# Patient Record
Sex: MALE | HISPANIC OR LATINO | Employment: OTHER | URBAN - METROPOLITAN AREA
[De-identification: names, ages, dates, MRNs, and addresses within clinical notes are randomized per-mention and may not be internally consistent; named-entity substitution may affect disease eponyms.]

---

## 2017-07-07 ENCOUNTER — TELEPHONE (OUTPATIENT)
Dept: CARDIOLOGY | Facility: MEDICAL CENTER | Age: 67
End: 2017-07-07

## 2017-07-07 ENCOUNTER — APPOINTMENT (OUTPATIENT)
Dept: RADIOLOGY | Facility: MEDICAL CENTER | Age: 67
End: 2017-07-07
Attending: EMERGENCY MEDICINE
Payer: MEDICARE

## 2017-07-07 ENCOUNTER — HOSPITAL ENCOUNTER (EMERGENCY)
Facility: MEDICAL CENTER | Age: 67
End: 2017-07-07
Attending: EMERGENCY MEDICINE
Payer: MEDICARE

## 2017-07-07 VITALS
WEIGHT: 170 LBS | TEMPERATURE: 97 F | OXYGEN SATURATION: 97 % | HEART RATE: 58 BPM | DIASTOLIC BLOOD PRESSURE: 78 MMHG | SYSTOLIC BLOOD PRESSURE: 133 MMHG | HEIGHT: 67 IN | BODY MASS INDEX: 26.68 KG/M2 | RESPIRATION RATE: 14 BRPM

## 2017-07-07 DIAGNOSIS — I48.0 PAROXYSMAL ATRIAL FIBRILLATION (HCC): ICD-10-CM

## 2017-07-07 LAB
ANION GAP SERPL CALC-SCNC: 9 MMOL/L (ref 0–11.9)
APTT PPP: 35.5 SEC (ref 24.7–36)
BASOPHILS # BLD AUTO: 0.3 % (ref 0–1.8)
BASOPHILS # BLD: 0.02 K/UL (ref 0–0.12)
BUN SERPL-MCNC: 14 MG/DL (ref 8–22)
CALCIUM SERPL-MCNC: 8.4 MG/DL (ref 8.5–10.5)
CHLORIDE SERPL-SCNC: 112 MMOL/L (ref 96–112)
CO2 SERPL-SCNC: 20 MMOL/L (ref 20–33)
CREAT SERPL-MCNC: 0.94 MG/DL (ref 0.5–1.4)
EOSINOPHIL # BLD AUTO: 0.13 K/UL (ref 0–0.51)
EOSINOPHIL NFR BLD: 2.1 % (ref 0–6.9)
ERYTHROCYTE [DISTWIDTH] IN BLOOD BY AUTOMATED COUNT: 44.4 FL (ref 35.9–50)
GFR SERPL CREATININE-BSD FRML MDRD: >60 ML/MIN/1.73 M 2
GLUCOSE SERPL-MCNC: 120 MG/DL (ref 65–99)
HCT VFR BLD AUTO: 41.5 % (ref 42–52)
HGB BLD-MCNC: 14.2 G/DL (ref 14–18)
IMM GRANULOCYTES # BLD AUTO: 0.02 K/UL (ref 0–0.11)
IMM GRANULOCYTES NFR BLD AUTO: 0.3 % (ref 0–0.9)
INR PPP: 1.2 (ref 0.87–1.13)
LV EJECT FRACT  99904: 60
LV EJECT FRACT MOD 2C 99903: 64.63
LV EJECT FRACT MOD 4C 99902: 52.71
LV EJECT FRACT MOD BP 99901: 58.45
LYMPHOCYTES # BLD AUTO: 2.28 K/UL (ref 1–4.8)
LYMPHOCYTES NFR BLD: 37.3 % (ref 22–41)
MCH RBC QN AUTO: 30.9 PG (ref 27–33)
MCHC RBC AUTO-ENTMCNC: 34.2 G/DL (ref 33.7–35.3)
MCV RBC AUTO: 90.4 FL (ref 81.4–97.8)
MONOCYTES # BLD AUTO: 0.57 K/UL (ref 0–0.85)
MONOCYTES NFR BLD AUTO: 9.3 % (ref 0–13.4)
NEUTROPHILS # BLD AUTO: 3.09 K/UL (ref 1.82–7.42)
NEUTROPHILS NFR BLD: 50.7 % (ref 44–72)
NRBC # BLD AUTO: 0.02 K/UL
NRBC BLD AUTO-RTO: 0.3 /100 WBC
PLATELET # BLD AUTO: 202 K/UL (ref 164–446)
PMV BLD AUTO: 9.3 FL (ref 9–12.9)
POTASSIUM SERPL-SCNC: 3.8 MMOL/L (ref 3.6–5.5)
PROTHROMBIN TIME: 15.6 SEC (ref 12–14.6)
RBC # BLD AUTO: 4.59 M/UL (ref 4.7–6.1)
SODIUM SERPL-SCNC: 141 MMOL/L (ref 135–145)
TROPONIN I SERPL-MCNC: <0.01 NG/ML (ref 0–0.04)
WBC # BLD AUTO: 6.1 K/UL (ref 4.8–10.8)

## 2017-07-07 PROCEDURE — 71010 DX-CHEST-PORTABLE (1 VIEW): CPT

## 2017-07-07 PROCEDURE — 94760 N-INVAS EAR/PLS OXIMETRY 1: CPT

## 2017-07-07 PROCEDURE — 85610 PROTHROMBIN TIME: CPT

## 2017-07-07 PROCEDURE — 36415 COLL VENOUS BLD VENIPUNCTURE: CPT

## 2017-07-07 PROCEDURE — 93005 ELECTROCARDIOGRAM TRACING: CPT | Performed by: EMERGENCY MEDICINE

## 2017-07-07 PROCEDURE — 93306 TTE W/DOPPLER COMPLETE: CPT | Mod: 26 | Performed by: INTERNAL MEDICINE

## 2017-07-07 PROCEDURE — 99285 EMERGENCY DEPT VISIT HI MDM: CPT

## 2017-07-07 PROCEDURE — 85025 COMPLETE CBC W/AUTO DIFF WBC: CPT

## 2017-07-07 PROCEDURE — 80048 BASIC METABOLIC PNL TOTAL CA: CPT

## 2017-07-07 PROCEDURE — 85730 THROMBOPLASTIN TIME PARTIAL: CPT

## 2017-07-07 PROCEDURE — 93306 TTE W/DOPPLER COMPLETE: CPT

## 2017-07-07 PROCEDURE — 84484 ASSAY OF TROPONIN QUANT: CPT

## 2017-07-07 ASSESSMENT — PAIN SCALES - GENERAL: PAINLEVEL_OUTOF10: 0

## 2017-07-07 ASSESSMENT — LIFESTYLE VARIABLES: DO YOU DRINK ALCOHOL: NO

## 2017-07-07 NOTE — ED PROVIDER NOTES
ED Provider Note    Scribed for Uvaldo Ramirez M.D. by Michell Garza. 7/7/2017  9:46 AM    Primary care provider: Lacie Shafer M.D.  Means of arrival: ambulance   History obtained from: patient   History limited by: none     CHIEF COMPLAINT  Chief Complaint   Patient presents with   • Dizziness   • Atrial Fibrillation       JEAN-PIERRE Romero is a 66 y.o. male who presents to the Emergency Department for evaluation of new onset atrial fibrillation onset this morning. The patient was being evaluated at the [facility] clinic earlier this morning for lightheadedness and generalized weakness onset three days ago. Patient states his symptoms have been worse while walking. An abnormal EKG indicated narrow complex tachycardia at the [facility] clinic. EMS were called for the patient. He received 6 doses of adenosine prior to his evaluation today secondary to concern for SVT.  The patient was shown to have atrial fibrillation after receiving the adenosine. He spontaneously self converted prior to evaluation.  He denies chest pain, shortness of breath and palpitations. He took aspirin prior to arrival.       REVIEW OF SYSTEMS  Pertinent positives include generalized weakness, lightheadedness.   Pertinent negatives include no chest pain, shortness of breath, palpitations.    All other systems reviewed and negative.  C.       PAST MEDICAL HISTORY   has a past medical history of Atrial fibrillation (8/28/2012); Chest pain (8/28/2012); Palpitations (8/28/2012); Hyperlipemia (8/28/2012); and Back pain.      SURGICAL HISTORY  patient denies any surgical history      SOCIAL HISTORY  Social History   Substance Use Topics   • Smoking status: Former Smoker -- 0.50 packs/day for 10 years     Quit date: 08/28/1982   • Smokeless tobacco: Never Used   • Alcohol Use: No      History   Drug Use Not on file       FAMILY HISTORY  None noted       CURRENT MEDICATIONS  Home Medications     **Home medications have not yet been reviewed for this  "encounter**          ALLERGIES  Allergies   Allergen Reactions   • Nkda [No Known Drug Allergy]        PHYSICAL EXAM  VITAL SIGNS: /78 mmHg  Pulse 148  Temp(Src) 36.1 °C (97 °F)  Resp 16  Ht 1.702 m (5' 7.01\")  Wt 77.111 kg (170 lb)  BMI 26.62 kg/m2  Nursing note and vitals reviewed.  Constitutional: Well-developed and well-nourished. No distress.   HENT: Head is normocephalic and atraumatic. Oropharynx is clear and moist without exudate or erythema.   Eyes: Pupils are equal, round, and reactive to light. Conjunctiva are normal.   Cardiovascular: Spontaneously converted to NSR upon my entry to the room.  Normal rate and regular rhythm. No murmur heard. Normal radial pulses.  Pulmonary/Chest: Breath sounds normal. No wheezes or rales.   Abdominal: Soft and non-tender. No distention    Musculoskeletal: Extremities exhibit normal range of motion without edema or tenderness.   Neurological: Awake, alert and oriented to person, place, and time. No focal deficits noted.  Skin: Skin is warm and dry. No rash.   Psychiatric: Normal mood and affect. Appropriate for clinical situation        DIAGNOSTIC STUDIES / PROCEDURES  LABS  Results for orders placed or performed during the hospital encounter of 07/07/17   CBC w/ Differential   Result Value Ref Range    WBC 6.1 4.8 - 10.8 K/uL    RBC 4.59 (L) 4.70 - 6.10 M/uL    Hemoglobin 14.2 14.0 - 18.0 g/dL    Hematocrit 41.5 (L) 42.0 - 52.0 %    MCV 90.4 81.4 - 97.8 fL    MCH 30.9 27.0 - 33.0 pg    MCHC 34.2 33.7 - 35.3 g/dL    RDW 44.4 35.9 - 50.0 fL    Platelet Count 202 164 - 446 K/uL    MPV 9.3 9.0 - 12.9 fL    Neutrophils-Polys 50.70 44.00 - 72.00 %    Lymphocytes 37.30 22.00 - 41.00 %    Monocytes 9.30 0.00 - 13.40 %    Eosinophils 2.10 0.00 - 6.90 %    Basophils 0.30 0.00 - 1.80 %    Immature Granulocytes 0.30 0.00 - 0.90 %    Nucleated RBC 0.30 /100 WBC    Neutrophils (Absolute) 3.09 1.82 - 7.42 K/uL    Lymphs (Absolute) 2.28 1.00 - 4.80 K/uL    Monos (Absolute) " 0.57 0.00 - 0.85 K/uL    Eos (Absolute) 0.13 0.00 - 0.51 K/uL    Baso (Absolute) 0.02 0.00 - 0.12 K/uL    Immature Granulocytes (abs) 0.02 0.00 - 0.11 K/uL    NRBC (Absolute) 0.02 K/uL   EKG (ER)   Result Value Ref Range    Report       [facility] Emergency Dept.    Test Date:  2017  Pt Name:    MICHAEL VILLAR              Department: ER  MRN:        [id number]                      Room:        02  Gender:     M                            Technician: [id number]  :        1950                   Requested By:JAIME SEXTON  Order #:    [id number]                    Reading MD:    Measurements  Intervals                                Axis  Rate:       66                           P:          60  AZ:         160                          QRS:        -13  QRSD:       92                           T:          47  QT:         412  QTc:        432    Interpretive Statements  SINUS RHYTHM  No previous ECG available for comparison     All labs reviewed by me.      RADIOLOGY  DX-CHEST-PORTABLE (1 VIEW)    (Results Pending)   The radiologist's interpretation of all radiological studies have been reviewed by me.      COURSE & MEDICAL DECISION MAKING  Nursing notes, VS, PMSFHx reviewed in chart.     Review of past medical records shows the patient's last note is from 2012 from the [facility] clinic.       9:46 AM - Patient seen and examined at bedside. Ordered DX chest, CBC, BMP, troponin, APTT, INR, and EKG to evaluate his symptoms.     9:49 AM The patient self converted in the ED.     9:51 AM Obtained and reviewed patient's records from earlier today which indicated the patient had narrow complex tachycardia with a rate of 150. The patient was given adenosine by paramedics which revealed atrial fibrillation.     9:53 AM Paged cardiology.     10:01 AM Consult with cardiology who agree to see the patient. Further treatment and disposition as per cardiology recommendations.      FINAL  IMPRESSION  1. Paroxysmal atrial fibrillation (CMS-HCC)         Michell ULLOA (Xochiltibneo), am scribing for, and in the presence of, Uvaldo Ramirez M.D..  Electronically signed by: Michell Garza (Geraldine), 7/7/2017  Uvaldo ULLOA M.D. personally performed the services described in this documentation, as scribed by Michell Garza in my presence, and it is both accurate and complete.    The note accurately reflects work and decisions made by me.  Uvaldo Ramirez  7/7/2017  11:58 AM

## 2017-07-07 NOTE — CONSULTS
Cardiology consultation note    Requesting physician: Dr. Ramirez ED physician    Reason for consultation Atrial fibrillation with rapid rate    HPI    The patient is a 66 y.o. male with history of paroxysmal atrial fibrillation at least since 2004. It has been somewhat infrequent, every few months or so.  He presents to the Emergency Department for evaluation of palpitations and dizziness since this morning.   He felt his heart was fast. He was also short of breath but no chest pain.  He had another episode a few days ago. He was being evaluated at the [facility] clinic earlier this morning.  EKG there by my review showed very rapid narrow complex tachycardia, slightly irregular. He was noted to be atrial fibrillation after receiving the adenosine. He spontaneously self converted prior to evaluation.    He denies exertional chest pain or recent change in exercise tolerance. He took aspirin prior to arrival.   No prior stroke, MI, CHF, DM or bleeding issue.    NKDA    Home medications; simvastatin, aspirin  Also taking another medication once a day for his heart but does not know the name    Past Medical History   Diagnosis Date   • Atrial fibrillation (CMS-HCC) 8/28/2012   • CHEST PAIN 8/28/2012   • Palpitations 8/28/2012   • Hyperlipemia 8/28/2012   • Back pain      History reviewed. No pertinent past surgical history.    No pertinent family history.    Social History     Social History   • Marital Status:      Spouse Name: N/A   • Number of Children: N/A   • Years of Education: N/A     Occupational History   • Not on file.     Social History Main Topics   • Smoking status: Former Smoker -- 0.50 packs/day for 10 years     Quit date: 08/28/1982   • Smokeless tobacco: Never Used   • Alcohol Use: No   • Drug Use: Not on file   • Sexual Activity: Not on file     Other Topics Concern   • Not on file     Social History Narrative     REVIEW OF SYSTEMS  Pertinent positives include generalized weakness, lightheadedness.  "   Pertinent negatives include no chest pain, shortness of breath, palpitations.     All other systems reviewed and negative.        PHYSICAL EXAM  VITAL SIGNS: /78 mmHg  Pulse 60  Temp(Src) 36.1 °C (97 °F)  Resp 16  Ht 1.702 m (5' 7.01\")  Wt 77.111 kg (170 lb)  BMI 26.62 kg/m2  Nursing note and vitals reviewed.  Constitutional: Well-developed and well-nourished. No distress.   HENT: Head is normocephalic and atraumatic. Oropharynx is clear and moist without exudate or erythema.   Eyes: Pupils are equal, round, and reactive to light. Conjunctiva are normal.    Cardiovascular: Spontaneously converted to NSR upon my entry to the room.  Normal rate and regular rhythm. No murmur heard. Normal radial pulses.  Pulmonary/Chest: Breath sounds normal. No wheezes or rales.   Abdominal: Soft and non-tender. No distention    Musculoskeletal: Extremities exhibit normal range of motion without edema or tenderness.   Neurological: Awake, alert and oriented to person, place, and time. No focal deficits noted.  Skin: Skin is warm and dry. No rash.   Psychiatric: Normal mood and affect. Appropriate for clinical situation    ECHO by my review showed normal LVSF, NL wall motion, mild LAE, trace MR    Impressions    1. Paroxysmal atrial fibrillation. [id number] score is 1  2. Hyperlipidemia    Recommendations:  Will try sotalol 80 mg BID.  Continue aspirin and statin.  Stop the third medication for now.  To follow up in our office next week (Monday or Tuesday).  Need EKG in clinic      "

## 2017-07-07 NOTE — ED AVS SNAPSHOT
7/7/2017    Abel [address] [address]    Dear Abel:    [facility] wants to ensure your discharge home is safe and you or your loved ones have had all of your questions answered regarding your care after you leave the hospital.    Below is a list of resources and contact information should you have any questions regarding your hospital stay, follow-up instructions, or active medical symptoms.    Questions or Concerns Regarding… Contact   Medical Questions Related to Your Discharge  (7 days a week, 8am-5pm) Contact a Nurse Care Coordinator   [phone]   Medical Questions Not Related to Your Discharge  (24 hours a day / 7 days a week)  Contact the Nurse Health Line   [phone]    Medications or Discharge Instructions Refer to your discharge packet   or contact your [facility] Primary Care Provider   [phone]   Follow-up Appointment(s) Schedule your appointment via [employer]   or contact Scheduling [phone]   Billing Review your statement via [employer]  or contact Billing [phone]   Medical Records Review your records via [employer]   or contact Medical Records [phone]     You may receive a telephone call within two days of discharge. This call is to make certain you understand your discharge instructions and have the opportunity to have any questions answered. You can also easily access your medical information, test results and upcoming appointments via the [employer] free online health management tool. You can learn more and sign up at [employer]ranem. For assistance setting up your [employer] account, please call [phone].    Once again, we want to ensure your discharge home is safe and that you have a clear understanding of any next steps in your care. If you have any questions or concerns, please do not hesitate to contact us, we are here for you. Thank you for choosing [facility] for your healthcare needs.    Sincerely,    Your [facility] Healthcare Team

## 2017-07-07 NOTE — DISCHARGE INSTRUCTIONS
Atrial Fibrillation  Atrial fibrillation is a type of irregular heart rhythm (arrhythmia). During atrial fibrillation, the upper chambers of the heart (atria) quiver continuously in a chaotic pattern. This causes an irregular and often rapid heart rate.   Atrial fibrillation is the result of the heart becoming overloaded with disorganized signals that tell it to beat. These signals are normally released one at a time by a part of the right atrium called the sinoatrial node. They then travel from the atria to the lower chambers of the heart (ventricles), causing the atria and ventricles to contract and pump blood as they pass. In atrial fibrillation, parts of the atria outside of the sinoatrial node also release these signals. This results in two problems. First, the atria receive so many signals that they do not have time to fully contract. Second, the ventricles, which can only receive one signal at a time, beat irregularly and out of rhythm with the atria.   There are three types of atrial fibrillation:   · Paroxysmal. Paroxysmal atrial fibrillation starts suddenly and stops on its own within a week.  · Persistent. Persistent atrial fibrillation lasts for more than a week. It may stop on its own or with treatment.  · Permanent. Permanent atrial fibrillation does not go away. Episodes of atrial fibrillation may lead to permanent atrial fibrillation.  Atrial fibrillation can prevent your heart from pumping blood normally. It increases your risk of stroke and can lead to heart failure.   CAUSES   · Heart conditions, including a heart attack, heart failure, coronary artery disease, and heart valve conditions.    · Inflammation of the sac that surrounds the heart (pericarditis).  · Blockage of an artery in the lungs (pulmonary embolism).  · Pneumonia or other infections.  · Chronic lung disease.  · Thyroid problems, especially if the thyroid is overactive (hyperthyroidism).  · Caffeine, excessive alcohol use, and use  of some illegal drugs.    · Use of some medicines, including certain decongestants and diet pills.  · Heart surgery.    · Birth defects.    Sometimes, no cause can be found. When this happens, the atrial fibrillation is called lone atrial fibrillation. The risk of complications from atrial fibrillation increases if you have lone atrial fibrillation and you are age 60 years or older.  RISK FACTORS  · Heart failure.  · Coronary artery disease.  · Diabetes mellitus.    · High blood pressure (hypertension).    · Obesity.    · Other arrhythmias.    · Increased age.  SIGNS AND SYMPTOMS   · A feeling that your heart is beating rapidly or irregularly.    · A feeling of discomfort or pain in your chest.    · Shortness of breath.    · Sudden light-headedness or weakness.    · Getting tired easily when exercising.    · Urinating more often than normal (mainly when atrial fibrillation first begins).    In paroxysmal atrial fibrillation, symptoms may start and suddenly stop.  DIAGNOSIS   Your health care provider may be able to detect atrial fibrillation when taking your pulse. Your health care provider may have you take a test called an ambulatory electrocardiogram (ECG). An ECG records your heartbeat patterns over a 24-hour period. You may also have other tests, such as:  · Transthoracic echocardiogram (TTE). During echocardiography, sound waves are used to evaluate how blood flows through your heart.  · Transesophageal echocardiogram (DUSTY).  · Stress test. There is more than one type of stress test. If a stress test is needed, ask your health care provider about which type is best for you.  · Chest X-ray exam.  · Blood tests.  · Computed tomography (CT).  TREATMENT   Treatment may include:  · Treating any underlying conditions. For example, if you have an overactive thyroid, treating the condition may correct atrial fibrillation.  · Taking medicine. Medicines may be given to control a rapid heart rate or to prevent blood  clots, heart failure, or a stroke.  · Having a procedure to correct the rhythm of the heart:  ¨ Electrical cardioversion. During electrical cardioversion, a controlled, low-energy shock is delivered to the heart through your skin. If you have chest pain, very low blood pressure, or sudden heart failure, this procedure may need to be done as an emergency.  ¨ Catheter ablation. During this procedure, heart tissues that send the signals that cause atrial fibrillation are destroyed.  ¨ Surgical ablation. During this surgery, thin lines of heart tissue that carry the abnormal signals are destroyed. This procedure can either be an open-heart surgery or a minimally invasive surgery. With the minimally invasive surgery, small cuts are made to access the heart instead of a large opening.  ¨ Pulmonary venous isolation. During this surgery, tissue around the veins that carry blood from the lungs (pulmonary veins) is destroyed. This tissue is thought to carry the abnormal signals.  HOME CARE INSTRUCTIONS   · Take medicines only as directed by your health care provider. Some medicines can make atrial fibrillation worse or recur.  · If blood thinners were prescribed by your health care provider, take them exactly as directed. Too much blood-thinning medicine can cause bleeding. If you take too little, you will not have the needed protection against stroke and other problems.  · Perform blood tests at home if directed by your health care provider. Perform blood tests exactly as directed.  · Quit smoking if you smoke.  · Do not drink alcohol.  · Do not drink caffeinated beverages such as coffee, soda, and some teas. You may drink decaffeinated coffee, soda, or tea.    · Maintain a healthy weight. Do not use diet pills unless your health care provider approves. They may make heart problems worse.    · Follow diet instructions as directed by your health care provider.  · Exercise regularly as directed by your health care  provider.  · Keep all follow-up visits as directed by your health care provider. This is important.  PREVENTION   The following substances can cause atrial fibrillation to recur:   · Caffeinated beverages.  · Alcohol.  · Certain medicines, especially those used for breathing problems.  · Certain herbs and herbal medicines, such as those containing ephedra or ginseng.  · Illegal drugs, such as cocaine and amphetamines.  Sometimes medicines are given to prevent atrial fibrillation from recurring. Proper treatment of any underlying condition is also important in helping prevent recurrence.   SEEK MEDICAL CARE IF:  · You notice a change in the rate, rhythm, or strength of your heartbeat.  · You suddenly begin urinating more frequently.  · You tire more easily when exerting yourself or exercising.  SEEK IMMEDIATE MEDICAL CARE IF:   · You have chest pain, abdominal pain, sweating, or weakness.  · You feel nauseous.  · You have shortness of breath.  · You suddenly have swollen feet and ankles.  · You feel dizzy.  · Your face or limbs feel numb or weak.  · You have a change in your vision or speech.  MAKE SURE YOU:   · Understand these instructions.  · Will watch your condition.  · Will get help right away if you are not doing well or get worse.     This information is not intended to replace advice given to you by your health care provider. Make sure you discuss any questions you have with your health care provider.     Document Released: 12/18/2006 Document Revised: 01/08/2016 Document Reviewed: 04/13/2016  [employer] Interactive Patient Education ©2016 [employer]

## 2017-07-07 NOTE — ED AVS SNAPSHOT
Home Care Instructions                                                                                                                Abel Romero   MRN: [id number]    Department:  [facility], Emergency Dept   Date of Visit:  7/7/2017            [facility], Emergency Dept    [address] [address]    Phone:  [phone]      You were seen by     Uvaldo Ramirez M.D.      Your Diagnosis Was     Paroxysmal atrial fibrillation ([facility])     I48.0       Follow-up Information     1. Follow up with [facility], Emergency Dept.    Specialty:  Emergency Medicine    Why:  If symptoms worsen    Contact information    [address] [address] [phone]        2. Schedule an appointment as soon as possible for a visit with Skinny Post M.D..    Specialty:  Interventional Cardiology    Contact information    [address]  [address]  [facility] [address] [phone]        Medication Information     Review all of your home medications and newly ordered medications with your primary doctor and/or pharmacist as soon as possible. Follow medication instructions as directed by your doctor and/or pharmacist.     Please keep your complete medication list with you and share with your physician. Update the information when medications are discontinued, doses are changed, or new medications (including over-the-counter products) are added; and carry medication information at all times in the event of emergency situations.               Medication List      ASK your doctor about these medications        Instructions    Morning Afternoon Evening Bedtime    NITROSTAT 0.4 MG Subl   Generic drug:  nitroglycerin        Place 0.4 mg under tongue every 5 minutes as needed.   Dose:  0.4 mg                        simvastatin 20 MG Tabs   Commonly known as:  ZOCOR        Take 1 Tab by mouth every evening.   Dose:  20 mg                                   Procedures and tests performed during your visit     APTT    Basic Metabolic Panel (BMP)    CBC w/ Differential    Cardiac Monitoring    DX-CHEST-PORTABLE (1 VIEW)    ECHOCARDIOGRAM COMP W/O CONT    EKG (ER)    EKG (NOW)    ESTIMATED GFR    IV Saline Lock    Oxygen    PT/INR    Pulse Ox    Troponin STAT    VITAL SIGNS        Discharge Instructions       Atrial Fibrillation  Atrial fibrillation is a type of irregular heart rhythm (arrhythmia). During atrial fibrillation, the upper chambers of the heart (atria) quiver continuously in a chaotic pattern. This causes an irregular and often rapid heart rate.   Atrial fibrillation is the result of the heart becoming overloaded with disorganized signals that tell it to beat. These signals are normally released one at a time by a part of the right atrium called the sinoatrial node. They then travel from the atria to the lower chambers of the heart (ventricles), causing the atria and ventricles to contract and pump blood as they pass. In atrial fibrillation, parts of the atria outside of the sinoatrial node also release these signals. This results in two problems. First, the atria receive so many signals that they do not have time to fully contract. Second, the ventricles, which can only receive one signal at a time, beat irregularly and out of rhythm with the atria.   There are three types of atrial fibrillation:   · Paroxysmal. Paroxysmal atrial fibrillation starts suddenly and stops on its own within a week.  · Persistent. Persistent atrial fibrillation lasts for more than a week. It may stop on its own or with treatment.  · Permanent. Permanent atrial fibrillation does not go away. Episodes of atrial fibrillation may lead to permanent atrial fibrillation.  Atrial fibrillation can prevent your heart from pumping blood normally. It increases your risk of stroke and can lead to heart failure.   CAUSES   · Heart conditions, including a heart attack, heart failure,  coronary artery disease, and heart valve conditions.    · Inflammation of the sac that surrounds the heart (pericarditis).  · Blockage of an artery in the lungs (pulmonary embolism).  · Pneumonia or other infections.  · Chronic lung disease.  · Thyroid problems, especially if the thyroid is overactive (hyperthyroidism).  · Caffeine, excessive alcohol use, and use of some illegal drugs.    · Use of some medicines, including certain decongestants and diet pills.  · Heart surgery.    · Birth defects.    Sometimes, no cause can be found. When this happens, the atrial fibrillation is called lone atrial fibrillation. The risk of complications from atrial fibrillation increases if you have lone atrial fibrillation and you are age 60 years or older.  RISK FACTORS  · Heart failure.  · Coronary artery disease.  · Diabetes mellitus.    · High blood pressure (hypertension).    · Obesity.    · Other arrhythmias.    · Increased age.  SIGNS AND SYMPTOMS   · A feeling that your heart is beating rapidly or irregularly.    · A feeling of discomfort or pain in your chest.    · Shortness of breath.    · Sudden light-headedness or weakness.    · Getting tired easily when exercising.    · Urinating more often than normal (mainly when atrial fibrillation first begins).    In paroxysmal atrial fibrillation, symptoms may start and suddenly stop.  DIAGNOSIS   Your health care provider may be able to detect atrial fibrillation when taking your pulse. Your health care provider may have you take a test called an ambulatory electrocardiogram (ECG). An ECG records your heartbeat patterns over a 24-hour period. You may also have other tests, such as:  · Transthoracic echocardiogram (TTE). During echocardiography, sound waves are used to evaluate how blood flows through your heart.  · Transesophageal echocardiogram (DUSTY).  · Stress test. There is more than one type of stress test. If a stress test is needed, ask your health care provider about  which type is best for you.  · Chest X-ray exam.  · Blood tests.  · Computed tomography (CT).  TREATMENT   Treatment may include:  · Treating any underlying conditions. For example, if you have an overactive thyroid, treating the condition may correct atrial fibrillation.  · Taking medicine. Medicines may be given to control a rapid heart rate or to prevent blood clots, heart failure, or a stroke.  · Having a procedure to correct the rhythm of the heart:  ¨ Electrical cardioversion. During electrical cardioversion, a controlled, low-energy shock is delivered to the heart through your skin. If you have chest pain, very low blood pressure, or sudden heart failure, this procedure may need to be done as an emergency.  ¨ Catheter ablation. During this procedure, heart tissues that send the signals that cause atrial fibrillation are destroyed.  ¨ Surgical ablation. During this surgery, thin lines of heart tissue that carry the abnormal signals are destroyed. This procedure can either be an open-heart surgery or a minimally invasive surgery. With the minimally invasive surgery, small cuts are made to access the heart instead of a large opening.  ¨ Pulmonary venous isolation. During this surgery, tissue around the veins that carry blood from the lungs (pulmonary veins) is destroyed. This tissue is thought to carry the abnormal signals.  HOME CARE INSTRUCTIONS   · Take medicines only as directed by your health care provider. Some medicines can make atrial fibrillation worse or recur.  · If blood thinners were prescribed by your health care provider, take them exactly as directed. Too much blood-thinning medicine can cause bleeding. If you take too little, you will not have the needed protection against stroke and other problems.  · Perform blood tests at home if directed by your health care provider. Perform blood tests exactly as directed.  · Quit smoking if you smoke.  · Do not drink alcohol.  · Do not drink caffeinated  beverages such as coffee, soda, and some teas. You may drink decaffeinated coffee, soda, or tea.    · Maintain a healthy weight. Do not use diet pills unless your health care provider approves. They may make heart problems worse.    · Follow diet instructions as directed by your health care provider.  · Exercise regularly as directed by your health care provider.  · Keep all follow-up visits as directed by your health care provider. This is important.  PREVENTION   The following substances can cause atrial fibrillation to recur:   · Caffeinated beverages.  · Alcohol.  · Certain medicines, especially those used for breathing problems.  · Certain herbs and herbal medicines, such as those containing ephedra or ginseng.  · Illegal drugs, such as cocaine and amphetamines.  Sometimes medicines are given to prevent atrial fibrillation from recurring. Proper treatment of any underlying condition is also important in helping prevent recurrence.   SEEK MEDICAL CARE IF:  · You notice a change in the rate, rhythm, or strength of your heartbeat.  · You suddenly begin urinating more frequently.  · You tire more easily when exerting yourself or exercising.  SEEK IMMEDIATE MEDICAL CARE IF:   · You have chest pain, abdominal pain, sweating, or weakness.  · You feel nauseous.  · You have shortness of breath.  · You suddenly have swollen feet and ankles.  · You feel dizzy.  · Your face or limbs feel numb or weak.  · You have a change in your vision or speech.  MAKE SURE YOU:   · Understand these instructions.  · Will watch your condition.  · Will get help right away if you are not doing well or get worse.     This information is not intended to replace advice given to you by your health care provider. Make sure you discuss any questions you have with your health care provider.     Document Released: 12/18/2006 Document Revised: 01/08/2016 Document Reviewed: 04/13/2016  [employer] Interactive Patient Education ©2016 [employer]             Patient Information     Patient Information    Following emergency treatment: all patient requiring follow-up care must return either to a private physician or a clinic if your condition worsens before you are able to obtain further medical attention, please return to the emergency room.     Billing Information    At [facility], we work to make the billing process streamlined for our patients.  Our Representatives are here to answer any questions you may have regarding your hospital bill.  If you have insurance coverage and have supplied your insurance information to us, we will submit a claim to your insurer on your behalf.  Should you have any questions regarding your bill, we can be reached online or by phone as follows:  Online: You are able pay your bills online or live chat with our representatives about any billing questions you may have. We are here to help Monday - Friday from 8:00am to 7:30pm and 9:00am - 12:00pm on Saturdays.  Please visit [email or web address]  for more information.   Phone:  [phone] or [phone]    Please note that your emergency physician, surgeon, pathologist, radiologist, anesthesiologist, and other specialists are not employed by [facility] and will therefore bill separately for their services.  Please contact them directly for any questions concerning their bills at the numbers below:     Emergency Physician Services:  [phone]  [facility]:  [phone]  [facility]:  [phone]  [facility]:  [phone]    1. Your final bill may vary from the amount quoted upon discharge if all procedures are not complete at that time, or if your doctor has additional procedures of which we are not aware. You will receive an additional bill if you return to the Emergency Department at [facility] for suture removal regardless of the facility of which the sutures were placed.     2. Please  arrange for settlement of this account at the emergency registration.    3. All self-pay accounts are due in full at the time of treatment.  If you are unable to meet this obligation then payment is expected within 4-5 days.     4. If you have had radiology studies (CT, X-ray, Ultrasound, MRI), you have received a preliminary result during your emergency department visit. Please contact the radiology department [phone] to receive a copy of your final result. Please discuss the Final result with your primary physician or with the follow up physician provided.     Crisis Hotline:  [place] Crisis Hotline:  0-139-DUVLUFP or 1-572.193.8582  [place] Crisis Hotline:    1-488.489.6326 or 008-418-2432         ED Discharge Follow Up Questions    1. In order to provide you with very good care, we would like to follow up with a phone call in the next few days.  May we have your permission to contact you?     YES /  NO    2. What is the best phone number to call you? (       )_____-__________    3. What is the best time to call you?      Morning  /  Afternoon  /  Evening                   Patient Signature:  ____________________________________________________________    Date:  ____________________________________________________________      Your appointments     Jul 11, 2017  8:15 AM   HOSPITAL FOLLOW UP with Nolan Granados M.D.   [facility] (--)    [address]  [place] [id number] [phone]

## 2017-07-07 NOTE — TELEPHONE ENCOUNTER
Received a call from Dr. Post. He saw the patient in the ER today for atrial fibrillation, he would like the patient seen on Monday or Tuesday next week for follow up.    Patient scheduled for an office visit with Dr. Granados 7/11/2017 at 8:15am. Spoke with patient's wife Kiera over the phone, advised her of appointment date/time.    CHUCK ROTH

## 2017-07-07 NOTE — ED NOTES
"PT BIB [facility] from the [facility] clinic.  PT was being seen for dizziness.  An EKG was completed and pt was found to be in a-fib.  PT had a HR of 210 and a BP of 85/70 PT was given 6 of adenosine and converted to A-fib with a rate of 150, /70.  PT arrives awake and alert in a-fib.  While assessing PT pt converted to NSR rate 64.    Chief Complaint   Patient presents with   • Dizziness   • Atrial Fibrillation     Blood pressure 133/78, pulse 148, temperature 36.1 °C (97 °F), resp. rate 16, height 1.702 m (5' 7.01\"), weight 77.111 kg (170 lb).    "

## 2017-07-07 NOTE — ED AVS SNAPSHOT
Enish Access Code: [email or web address]  Expires: 8/6/2017 12:26 PM    Your email address is not on file at [facility].  Email Addresses are required for you to sign up for Enish, please contact [phone] to verify your personal information and to provide your email address prior to attempting to register for [place]ish.    [place] [address]    Enish  A secure, online tool to manage your health information     [facility]’s Enish® is a secure, online tool that connects you to your personalized health information from the privacy of your home -- day or night - making it very easy for you to manage your healthcare. Once the activation process is completed, you can even access your medical information using the Enish meghana, which is available for free in the Apple Meghana store or Google Play store.     To learn more about Enish, visit [email or web address]    There are two levels of access available (as shown below):   My Chart Features  [facility] Primary Care Doctor [facility]  Specialists [facility]  Urgent  Care Non-[facility] Primary Care Doctor   Email your healthcare team securely and privately 24/7 X X X    Manage appointments: schedule your next appointment; view details of past/upcoming appointments X      Request prescription refills. X      View recent personal medical records, including lab and immunizations X X X X   View health record, including health history, allergies, medications X X X X   Read reports about your outpatient visits, procedures, consult and ER notes X X X X   See your discharge summary, which is a recap of your hospital and/or ER visit that includes your diagnosis, lab results, and care plan X X  X     How to register for Enish:  Once your e-mail address has been verified, follow the following steps to sign up for Enish.     1. Go to  [email or web address]  2. Click on the Sign Up Now box, which takes you to the New Member Sign Up page. You will need  to provide the following information:  a. Enter your Telebit Access Code exactly as it appears at the top of this page. (You will not need to use this code after you’ve completed the sign-up process. If you do not sign up before the expiration date, you must request a new code.)   b. Enter your date of birth.   c. Enter your home email address.   d. Click Submit, and follow the next screen’s instructions.  3. Create a Telebit ID. This will be your Telebit login ID and cannot be changed, so think of one that is secure and easy to remember.  4. Create a Telebit password. You can change your password at any time.  5. Enter your Password Reset Question and Answer. This can be used at a later time if you forget your password.   6. Enter your e-mail address. This allows you to receive e-mail notifications when new information is available in Telebit.  7. Click Sign Up. You can now view your health information.    For assistance activating your Telebit account, call [phone]

## 2017-07-11 ENCOUNTER — OFFICE VISIT (OUTPATIENT)
Dept: CARDIOLOGY | Facility: MEDICAL CENTER | Age: 67
End: 2017-07-11
Payer: MEDICARE

## 2017-07-11 VITALS
WEIGHT: 161 LBS | DIASTOLIC BLOOD PRESSURE: 80 MMHG | HEIGHT: 65 IN | OXYGEN SATURATION: 97 % | SYSTOLIC BLOOD PRESSURE: 126 MMHG | BODY MASS INDEX: 26.82 KG/M2 | HEART RATE: 49 BPM

## 2017-07-11 DIAGNOSIS — E78.5 DYSLIPIDEMIA: ICD-10-CM

## 2017-07-11 DIAGNOSIS — I48.0 PAROXYSMAL ATRIAL FIBRILLATION (HCC): ICD-10-CM

## 2017-07-11 PROCEDURE — 99214 OFFICE O/P EST MOD 30 MIN: CPT | Performed by: INTERNAL MEDICINE

## 2017-07-11 RX ORDER — DOXAZOSIN MESYLATE 1 MG/1
TABLET ORAL
COMMUNITY
Start: 2017-06-29 | End: 2017-09-06

## 2017-07-11 RX ORDER — ATORVASTATIN CALCIUM 20 MG/1
20 TABLET, FILM COATED ORAL DAILY
Status: ON HOLD | COMMUNITY
Start: 2017-06-29 | End: 2017-08-11

## 2017-07-11 RX ORDER — SOTALOL HYDROCHLORIDE 80 MG/1
40 TABLET ORAL 2 TIMES DAILY
Status: ON HOLD | COMMUNITY
Start: 2017-07-07 | End: 2017-08-11

## 2017-07-11 RX ORDER — METOPROLOL SUCCINATE 50 MG/1
TABLET, EXTENDED RELEASE ORAL
COMMUNITY
Start: 2017-06-29 | End: 2017-09-06

## 2017-07-11 NOTE — MR AVS SNAPSHOT
"        Abel Romero   2017 8:15 AM   Office Visit   MRN: [id number]    Department:  [facility]   Dept Phone:  [phone]    Description:  Male : 1950   Provider:  Nolan Granados M.D.           Reason for Visit     Hospital Follow-up           Allergies as of 2017     Allergen Noted Reactions    Nkda [No Known Drug Allergy] 10/01/2012         You were diagnosed with     Paroxysmal atrial fibrillation (CMS-HCC)   [143799]       Dyslipidemia   [453387]         Vital Signs     Blood Pressure Pulse Height Weight Body Mass Index Oxygen Saturation    126/80 mmHg 49 1.651 m (5' 5\") 73.029 kg (161 lb) 26.79 kg/m2 97%    Smoking Status                   Former Smoker           Basic Information     Date Of Birth Sex Race Ethnicity Preferred Language    1950 Male  or   Origin (Vincentian,Belizean,Liberian,Rickie, etc) English      Problem List              ICD-10-CM Priority Class Noted - Resolved    Paroxysmal atrial fibrillation (CMS-HCC) I48.0 Medium  2012 - Present    CHEST PAIN    2012 - Present    Palpitations R00.2   2012 - Present    Dyslipidemia E78.5 Medium  2012 - Present      Health Maintenance        Date Due Completion Dates    IMM DTaP/Tdap/Td Vaccine (1 - Tdap) 1969 ---    COLONOSCOPY 2000 ---    IMM ZOSTER VACCINE 2010 ---    IMM PNEUMOCOCCAL 65+ (ADULT) LOW/MEDIUM RISK SERIES (1 of 2 - PCV13) 2015 ---    IMM INFLUENZA (1) 2017 ---            Results       Current Immunizations     No immunizations on file.      Below and/or attached are the medications your provider expects you to take. Review all of your home medications and newly ordered medications with your provider and/or pharmacist. Follow medication instructions as directed by your provider and/or pharmacist. Please keep your medication list with you and share with your provider. Update the information when medications are discontinued, doses are changed, or " new medications (including over-the-counter products) are added; and carry medication information at all times in the event of emergency situations     Allergies:  NKDA - (reactions not documented)               Medications  Valid as of: July 11, 2017 -  8:36 AM    Generic Name Brand Name Tablet Size Instructions for use    Aspirin (Tab) aspirin 81 MG Take 81 mg by mouth every day.        Atorvastatin Calcium (Tab) LIPITOR 20 MG         Doxazosin Mesylate (Tab) CARDURA 1 MG         Metoprolol Succinate (TABLET SR 24 HR) TOPROL XL 50 MG         Nitroglycerin (SL Tab) NITROSTAT 0.4 MG Place 0.4 mg under tongue every 5 minutes as needed.        Simvastatin (Tab) ZOCOR 20 MG Take 1 Tab by mouth every evening.        Sotalol HCl (Tab) BETAPACE 80 MG         .                 Medicines prescribed today were sent to:     [facility] [address] [place] [address], [place] - [address] [facility] [address]    Phone: [phone] Fax: [phone]    Open 24 Hours?: No      Medication refill instructions:       If your prescription bottle indicates you have medication refills left, it is not necessary to call your provider’s office. Please contact your pharmacy and they will refill your medication.    If your prescription bottle indicates you do not have any refills left, you may request refills at any time through one of the following ways: The online Playrific system (except Urgent Care), by calling your provider’s office, or by asking your pharmacy to contact your provider’s office with a refill request. Medication refills are processed only during regular business hours and may not be available until the next business day. Your provider may request additional information or to have a follow-up visit with you prior to refilling your medication.   *Please Note: Medication refills are assigned a new Rx number when refilled electronically. Your pharmacy may indicate that no refills were authorized even though a new prescription for  the same medication is available at the pharmacy. Please request the medicine by name with the pharmacy before contacting your provider for a refill.        Referral     A referral request has been sent to our patient care coordination department. Please allow 3-5 business days for us to process this request and contact you either by phone or mail. If you do not hear from us by the 5th business day, please call us at [phone].           SnapYeti Access Code: [email or web address]  Expires: 8/6/2017 12:26 PM    SnapYeti  A secure, online tool to manage your health information     [employer]’s SnapYeti® is a secure, online tool that connects you to your personalized health information from the privacy of your home -- day or night - making it very easy for you to manage your healthcare. Once the activation process is completed, you can even access your medical information using the SnapYeti meghana, which is available for free in the Apple Meghana store or Google Play store.     SnapYeti provides the following levels of access (as shown below):   My Chart Features   [facility] Doctor [facility]  Specialists [facility]  Urgent  Care [facility]  Primary Care  Doctor   Email your healthcare team securely and privately 24/7 X X X    Manage appointments: schedule your next appointment; view details of past/upcoming appointments X      Request prescription refills. X      View recent personal medical records, including lab and immunizations X X X X   View health record, including health history, allergies, medications X X X X   Read reports about your outpatient visits, procedures, consult and ER notes X X X X   See your discharge summary, which is a recap of your hospital and/or ER visit that includes your diagnosis, lab results, and care plan. X X       How to register for SnapYeti:  1. Go to  [email or web address].  2. Click on the Sign Up Now box, which takes you to the New Member Sign Up page. You will need to provide the  following information:  a. Enter your YDreams - Inform[employer]¡tica Access Code exactly as it appears at the top of this page. (You will not need to use this code after you’ve completed the sign-up process. If you do not sign up before the expiration date, you must request a new code.)   b. Enter your date of birth.   c. Enter your home email address.   d. Click Submit, and follow the next screen’s instructions.  3. Create a YDreams - Inform[employer]¡tica ID. This will be your YDreams - Inform[employer] login ID and cannot be changed, so think of one that is secure and easy to remember.  4. Create a [employer]reams [employer] In[employer] password. You can change your password at any time.  5. Enter your Password Reset Question and Answer. This can be used at a later time if you forget your password.   6. Enter your e-mail address. This allows you to receive e-mail notifications when new information is available in YDreams - InformÃ¡tica.  7. Click Sign Up. You can now view your health information.    For assistance activating your YDreams - InformÃ¡tica account, call [phone]

## 2017-07-11 NOTE — PROGRESS NOTES
"Subjective:   Abel Romero is a 66 y.o. male who presents today for follow-up of his recent ER visit because of PAF. He converted out of this rhythm and was started on sotalol therapy.    He's had no recurrent palpitations. He's noted no dyspnea, chest discomfort, edema or lightheadedness.    Past Medical History   Diagnosis Date   • Atrial fibrillation (CMS-HCC) 8/28/2012   • CHEST PAIN 8/28/2012   • Palpitations 8/28/2012   • Hyperlipemia 8/28/2012   • Back pain      History reviewed. No pertinent past surgical history.  History reviewed. No pertinent family history.  History   Smoking status   • Former Smoker -- 0.50 packs/day for 10 years   • Quit date: 08/28/1982   Smokeless tobacco   • Never Used     Allergies   Allergen Reactions   • Nkda [No Known Drug Allergy]      Outpatient Encounter Prescriptions as of 7/11/2017   Medication Sig Dispense Refill   • atorvastatin (LIPITOR) 20 MG Tab      • doxazosin (CARDURA) 1 MG Tab      • metoprolol SR (TOPROL XL) 50 MG TABLET SR 24 HR      • sotalol (BETAPACE) 80 MG Tab      • aspirin 81 MG tablet Take 81 mg by mouth every day.     • simvastatin (ZOCOR) 20 MG TABS Take 1 Tab by mouth every evening. (Patient not taking: Reported on 7/11/2017) 30 Tab 11   • nitroglycerin (NITROSTAT) 0.4 MG SUBL Place 0.4 mg under tongue every 5 minutes as needed.       No facility-administered encounter medications on file as of 7/11/2017.     ROS     Objective:   /80 mmHg  Pulse 49  Ht 1.651 m (5' 5\")  Wt 73.029 kg (161 lb)  BMI 26.79 kg/m2  SpO2 97%    Physical Exam   Neck: No JVD present.   Cardiovascular: Normal rate and regular rhythm.  Exam reveals no gallop.    No murmur heard.  Pulmonary/Chest: Effort normal. He has no rales.   Abdominal: Soft. There is no tenderness.   Musculoskeletal: He exhibits no edema.     No results found for: CHOLSTRLTOT, LDL, HDL, TRIGLYCERIDE    Lab Results   Component Value Date/Time    SODIUM 141 07/07/2017 09:47 AM    POTASSIUM 3.8 " 07/07/2017 09:47 AM    CHLORIDE 112 07/07/2017 09:47 AM    CO2 20 07/07/2017 09:47 AM    GLUCOSE 120* 07/07/2017 09:47 AM    BUN 14 07/07/2017 09:47 AM    CREATININE 0.94 07/07/2017 09:47 AM     No results found for: ALKPHOSPHAT, ASTSGOT, ALTSGPT, TBILIRUBIN   No results found for: BNPBTYPENAT     EKG: Shows a sinus bradycardia with a rate of 42 BPM. QTC is within normal limits.    Assessment:     1. Paroxysmal atrial fibrillation (CMS-HCC)     2. Dyslipidemia         Medical Decision Making:  Today's Assessment / Status / Plan:     Mr. Romero is having difficulty with bradycardia on sotalol therapy. He was previously on metoprolol that has not been taking this medication since he was started on sotalol. At this time, we will reduce sotalol to 40 mg twice a day. We'll refer him to EP for consideration of alternative antiarrhythmic therapy.

## 2017-07-11 NOTE — Clinical Note
"     [facility]   [address]  [place] [address]  Phone: [phone]  Fax: [phone]              Abel Romero  1950    Encounter Date: 7/11/2017    Nolan Granados M.D.          PROGRESS NOTE:  Subjective:   Abel Romero is a 66 y.o. male who presents today for follow-up of his recent ER visit because of PAF. He converted out of this rhythm and was started on sotalol therapy.    He's had no recurrent palpitations. He's noted no dyspnea, chest discomfort, edema or lightheadedness.    Past Medical History   Diagnosis Date   • Atrial fibrillation (CMS-HCC) 8/28/2012   • CHEST PAIN 8/28/2012   • Palpitations 8/28/2012   • Hyperlipemia 8/28/2012   • Back pain      History reviewed. No pertinent past surgical history.  History reviewed. No pertinent family history.  History   Smoking status   • Former Smoker -- 0.50 packs/day for 10 years   • Quit date: 08/28/1982   Smokeless tobacco   • Never Used     Allergies   Allergen Reactions   • Nkda [No Known Drug Allergy]      Outpatient Encounter Prescriptions as of 7/11/2017   Medication Sig Dispense Refill   • atorvastatin (LIPITOR) 20 MG Tab      • doxazosin (CARDURA) 1 MG Tab      • metoprolol SR (TOPROL XL) 50 MG TABLET SR 24 HR      • sotalol (BETAPACE) 80 MG Tab      • aspirin 81 MG tablet Take 81 mg by mouth every day.     • simvastatin (ZOCOR) 20 MG TABS Take 1 Tab by mouth every evening. (Patient not taking: Reported on 7/11/2017) 30 Tab 11   • nitroglycerin (NITROSTAT) 0.4 MG SUBL Place 0.4 mg under tongue every 5 minutes as needed.       No facility-administered encounter medications on file as of 7/11/2017.     ROS     Objective:   /80 mmHg  Pulse 49  Ht 1.651 m (5' 5\")  Wt 73.029 kg (161 lb)  BMI 26.79 kg/m2  SpO2 97%    Physical Exam   Neck: No JVD present.   Cardiovascular: Normal rate and regular rhythm.  Exam reveals no gallop.    No murmur heard.  Pulmonary/Chest: Effort " normal. He has no rales.   Abdominal: Soft. There is no tenderness.   Musculoskeletal: He exhibits no edema.     No results found for: CHOLSTRLTOT, LDL, HDL, TRIGLYCERIDE    Lab Results   Component Value Date/Time    SODIUM 141 07/07/2017 09:47 AM    POTASSIUM 3.8 07/07/2017 09:47 AM    CHLORIDE 112 07/07/2017 09:47 AM    CO2 20 07/07/2017 09:47 AM    GLUCOSE 120* 07/07/2017 09:47 AM    BUN 14 07/07/2017 09:47 AM    CREATININE 0.94 07/07/2017 09:47 AM     No results found for: ALKPHOSPHAT, ASTSGOT, ALTSGPT, TBILIRUBIN   No results found for: BNPBTYPENAT     EKG: Shows a sinus bradycardia with a rate of 42 BPM. QTC is within normal limits.    Assessment:     1. Paroxysmal atrial fibrillation (CMS-HCC)     2. Dyslipidemia         Medical Decision Making:  Today's Assessment / Status / Plan:     Mr. Romero is having difficulty with bradycardia on sotalol therapy. He was previously on metoprolol that has not been taking this medication since he was started on sotalol. At this time, we will reduce sotalol to 40 mg twice a day. We'll refer him to EP for consideration of alternative antiarrhythmic therapy.      Marlen Rodney, P.A.  [address] [address]  VIA Facsimile: [phone]

## 2017-07-12 LAB — EKG IMPRESSION: NORMAL

## 2017-07-14 LAB — EKG IMPRESSION: NORMAL

## 2017-08-08 ENCOUNTER — OFFICE VISIT (OUTPATIENT)
Dept: CARDIOLOGY | Facility: MEDICAL CENTER | Age: 67
End: 2017-08-08
Payer: MEDICARE

## 2017-08-08 ENCOUNTER — TELEPHONE (OUTPATIENT)
Dept: CARDIOLOGY | Facility: MEDICAL CENTER | Age: 67
End: 2017-08-08

## 2017-08-08 VITALS
HEART RATE: 46 BPM | BODY MASS INDEX: 26.33 KG/M2 | WEIGHT: 158 LBS | DIASTOLIC BLOOD PRESSURE: 70 MMHG | HEIGHT: 65 IN | SYSTOLIC BLOOD PRESSURE: 110 MMHG | OXYGEN SATURATION: 95 %

## 2017-08-08 DIAGNOSIS — I48.0 PAF (PAROXYSMAL ATRIAL FIBRILLATION) (HCC): ICD-10-CM

## 2017-08-08 DIAGNOSIS — R00.1 BRADYCARDIA: ICD-10-CM

## 2017-08-08 LAB — EKG IMPRESSION: NORMAL

## 2017-08-08 PROCEDURE — 93000 ELECTROCARDIOGRAM COMPLETE: CPT | Performed by: INTERNAL MEDICINE

## 2017-08-08 PROCEDURE — 99205 OFFICE O/P NEW HI 60 MIN: CPT | Mod: 25 | Performed by: INTERNAL MEDICINE

## 2017-08-08 ASSESSMENT — ENCOUNTER SYMPTOMS
PALPITATIONS: 1
LOSS OF CONSCIOUSNESS: 0
DIZZINESS: 1

## 2017-08-08 NOTE — MR AVS SNAPSHOT
"        Abel Romero   2017 12:40 PM   Office Visit   MRN: [id number]    Department:  [facility]   Dept Phone:  [phone]    Description:  Male : 1950   Provider:  Jose M Wagoner M.D.           Reason for Visit     Follow-Up           Allergies as of 2017     Allergen Noted Reactions    Nkda [No Known Drug Allergy] 10/01/2012         You were diagnosed with     PAF (paroxysmal atrial fibrillation) (CMS-HCC)   [076624]       Bradycardia   [197101]         Vital Signs     Blood Pressure Pulse Height Weight Body Mass Index Oxygen Saturation    110/70 mmHg 46 1.651 m (5' 5\") 71.668 kg (158 lb) 26.29 kg/m2 95%    Smoking Status                   Former Smoker           Basic Information     Date Of Birth Sex Race Ethnicity Preferred Language    1950 Male  or   Origin (Gambian,Indian,Vincentian,British, etc) English      Problem List              ICD-10-CM Priority Class Noted - Resolved    Paroxysmal atrial fibrillation (CMS-HCC) I48.0 Medium  2012 - Present    CHEST PAIN    2012 - Present    Palpitations R00.2   2012 - Present    Dyslipidemia E78.5 Medium  2012 - Present      Health Maintenance        Date Due Completion Dates    IMM DTaP/Tdap/Td Vaccine (1 - Tdap) 1969 ---    COLONOSCOPY 2000 ---    IMM ZOSTER VACCINE 2010 ---    IMM PNEUMOCOCCAL 65+ (ADULT) LOW/MEDIUM RISK SERIES (1 of 2 - PCV13) 2015 ---    IMM INFLUENZA (1) 2017 ---            Results       Current Immunizations     No immunizations on file.      Below and/or attached are the medications your provider expects you to take. Review all of your home medications and newly ordered medications with your provider and/or pharmacist. Follow medication instructions as directed by your provider and/or pharmacist. Please keep your medication list with you and share with your provider. Update the information when medications are discontinued, doses are changed, or new " medications (including over-the-counter products) are added; and carry medication information at all times in the event of emergency situations     Allergies:  NKDA - (reactions not documented)               Medications  Valid as of: August 08, 2017 -  1:53 PM    Generic Name Brand Name Tablet Size Instructions for use    Aspirin (Tab) aspirin 81 MG Take 81 mg by mouth every day.        Atorvastatin Calcium (Tab) LIPITOR 20 MG Take 20 mg by mouth every day.        Doxazosin Mesylate (Tab) CARDURA 1 MG         Metoprolol Succinate (TABLET SR 24 HR) TOPROL XL 50 MG         Nitroglycerin (SL Tab) NITROSTAT 0.4 MG Place 0.4 mg under tongue every 5 minutes as needed.        Simvastatin (Tab) ZOCOR 20 MG Take 1 Tab by mouth every evening.        Sotalol HCl (Tab) BETAPACE 80 MG Take 40 mg by mouth 2 times a day.        .                 Medicines prescribed today were sent to:     [facility] [address] [place] [address], [place] [address]     [address] [facility] [address]    Phone: [phone] Fax: [phone]    Open 24 Hours?: No      Medication refill instructions:       If your prescription bottle indicates you have medication refills left, it is not necessary to call your provider’s office. Please contact your pharmacy and they will refill your medication.    If your prescription bottle indicates you do not have any refills left, you may request refills at any time through one of the following ways: The online Exakis system (except Urgent Care), by calling your provider’s office, or by asking your pharmacy to contact your provider’s office with a refill request. Medication refills are processed only during regular business hours and may not be available until the next business day. Your provider may request additional information or to have a follow-up visit with you prior to refilling your medication.   *Please Note: Medication refills are assigned a new Rx number when refilled electronically. Your pharmacy may indicate that  no refills were authorized even though a new prescription for the same medication is available at the pharmacy. Please request the medicine by name with the pharmacy before contacting your provider for a refill.           modulR Access Code: [email or web address]  Expires: 8/31/2017  4:07 AM    modulR  A secure, online tool to manage your health information     [employer]’s modulR® is a secure, online tool that connects you to your personalized health information from the privacy of your home -- day or night - making it very easy for you to manage your healthcare. Once the activation process is completed, you can even access your medical information using the modulR meghana, which is available for free in the Apple Meghana store or Google Play store.     modulR provides the following levels of access (as shown below):   My Chart Features   [facility] Doctor [facility]  Specialists [facility]  Urgent  Care [facility]  Primary Care  Doctor   Email your healthcare team securely and privately 24/7 X X X    Manage appointments: schedule your next appointment; view details of past/upcoming appointments X      Request prescription refills. X      View recent personal medical records, including lab and immunizations X X X X   View health record, including health history, allergies, medications X X X X   Read reports about your outpatient visits, procedures, consult and ER notes X X X X   See your discharge summary, which is a recap of your hospital and/or ER visit that includes your diagnosis, lab results, and care plan. X X       How to register for modulR:  1. Go to  [email or web address].  2. Click on the Sign Up Now box, which takes you to the New Member Sign Up page. You will need to provide the following information:  a. Enter your modulR Access Code exactly as it appears at the top of this page. (You will not need to use this code after you’ve completed the sign-up process. If you do not sign up before the  expiration date, you must request a new code.)   b. Enter your date of birth.   c. Enter your home email address.   d. Click Submit, and follow the next screen’s instructions.  3. Create a [employer] Khakis ID. This will be your [employer] Khakis login ID and cannot be changed, so think of one that is secure and easy to remember.  4. Create a [employer] password. You can change your password at any time.  5. Enter your Password Reset Question and Answer. This can be used at a later time if you forget your password.   6. Enter your e-mail address. This allows you to receive e-mail notifications when new information is available in Bills Khakis.  7. Click Sign Up. You can now view your health information.    For assistance activating your [employer] Khakis account, call [phone]

## 2017-08-08 NOTE — TELEPHONE ENCOUNTER
Patient scheduled on 8-10-17 for a PPM with Dr. Wagoner at [facility]. No meds to stop. Patient was told to check in at 7:00am for a 9:00 procedure. Mini with [place] notified on 8-8-17.

## 2017-08-08 NOTE — PROGRESS NOTES
"Subjective:   Abel Romero is a 67 y.o. male who presents today to discuss bradycardia.    It used to happen rarely but now more frequently.  Now getting to point where he feels like he is going to pass out.    He was at doctor office when he had the last episode.    Was seen in a fib with rapid rates and started on sotalol in July.  Now going really slow.  Since starting the sotalol his heart has not been racing at all.  But gets a pressure in the head.  He feels \"fragile.\"      He thinks a fib dates back numerous years.  He agrees with my chart review that dates back to 2004.      Past Medical History   Diagnosis Date   • Atrial fibrillation (CMS-HCC) 8/28/2012   • CHEST PAIN 8/28/2012   • Palpitations 8/28/2012   • Hyperlipemia 8/28/2012   • Back pain      History reviewed. No pertinent past surgical history.  History reviewed. No pertinent family history.  History   Smoking status   • Former Smoker -- 0.50 packs/day for 10 years   • Quit date: 08/28/1982   Smokeless tobacco   • Never Used     Allergies   Allergen Reactions   • Nkda [No Known Drug Allergy]      Outpatient Encounter Prescriptions as of 8/8/2017   Medication Sig Dispense Refill   • atorvastatin (LIPITOR) 20 MG Tab Take 20 mg by mouth every day.     • sotalol (BETAPACE) 80 MG Tab Take 40 mg by mouth 2 times a day.     • aspirin 81 MG tablet Take 81 mg by mouth every day.     • nitroglycerin (NITROSTAT) 0.4 MG SUBL Place 0.4 mg under tongue every 5 minutes as needed.     • doxazosin (CARDURA) 1 MG Tab      • metoprolol SR (TOPROL XL) 50 MG TABLET SR 24 HR      • simvastatin (ZOCOR) 20 MG TABS Take 1 Tab by mouth every evening. (Patient not taking: Reported on 7/11/2017) 30 Tab 11     No facility-administered encounter medications on file as of 8/8/2017.     Review of Systems   Cardiovascular: Positive for palpitations. Negative for chest pain.   Neurological: Positive for dizziness. Negative for loss of consciousness.   All other systems reviewed " "and are negative.       Objective:   /70 mmHg  Pulse 46  Ht 1.651 m (5' 5\")  Wt 71.668 kg (158 lb)  BMI 26.29 kg/m2  SpO2 95%    Physical Exam   Constitutional: He is oriented to person, place, and time. He appears well-developed and well-nourished. No distress.   HENT:   Head: Normocephalic and atraumatic.   Right Ear: External ear normal.   Left Ear: External ear normal.   Nose: Nose normal.   Mouth/Throat: Oropharynx is clear and moist.   Eyes: Conjunctivae and EOM are normal. Pupils are equal, round, and reactive to light. Right eye exhibits no discharge. Left eye exhibits no discharge. No scleral icterus.   Neck: Normal range of motion. Neck supple. No JVD present. No tracheal deviation present.   Cardiovascular: Regular rhythm, normal heart sounds and intact distal pulses.  Bradycardia present.  Exam reveals no gallop and no friction rub.    No murmur heard.  Pulmonary/Chest: Effort normal and breath sounds normal. No stridor. No respiratory distress. He has no wheezes. He has no rales. He exhibits no tenderness.   Abdominal: Soft. He exhibits no distension. There is no tenderness.   Musculoskeletal: He exhibits no edema or tenderness.   Neurological: He is alert and oriented to person, place, and time. No cranial nerve deficit. Coordination normal.   Skin: Skin is warm and dry. No rash noted. He is not diaphoretic. No erythema. No pallor.   Psychiatric: He has a normal mood and affect. His behavior is normal. Judgment and thought content normal.   Vitals reviewed.      Assessment:     1. PAF (paroxysmal atrial fibrillation) (CMS-[facility])  EKG   2. Bradycardia  EKG     Reviewed ecg from [facility] scanned into media and shows a fib rvr avg 150  Today hr 46 eiyh qt 460  Notes state he received numerous rounds of adenosine so not clear if it was a fib all along that did not respond to adenosine vs adenosine causing the a fib and if it was svt.  preop labs ordered.  Medical Decision Making:  Today's " Assessment / Status / Plan:     Unclear if this spontaneous a fib vs caused by the adenosine.  neverthe less going very slow now.  Options include stopping meds and monitoring to see mechanism of tacycardia.  Alternatively could place ppm for his tachy bradycardia syndrome.  He understands the options, and will go with the ppm for tachybrady.  Understands risk and benefits.

## 2017-08-10 ENCOUNTER — APPOINTMENT (OUTPATIENT)
Dept: RADIOLOGY | Facility: MEDICAL CENTER | Age: 67
End: 2017-08-10
Attending: INTERNAL MEDICINE
Payer: MEDICARE

## 2017-08-10 ENCOUNTER — HOSPITAL ENCOUNTER (OUTPATIENT)
Facility: MEDICAL CENTER | Age: 67
End: 2017-08-11
Attending: INTERNAL MEDICINE | Admitting: INTERNAL MEDICINE
Payer: MEDICARE

## 2017-08-10 PROBLEM — R00.1 BRADYCARDIA: Status: ACTIVE | Noted: 2017-08-10

## 2017-08-10 LAB
ALBUMIN SERPL BCP-MCNC: 4.2 G/DL (ref 3.2–4.9)
ALBUMIN/GLOB SERPL: 1.5 G/DL
ALP SERPL-CCNC: 63 U/L (ref 30–99)
ALT SERPL-CCNC: 22 U/L (ref 2–50)
ANION GAP SERPL CALC-SCNC: 6 MMOL/L (ref 0–11.9)
AST SERPL-CCNC: 17 U/L (ref 12–45)
BILIRUB SERPL-MCNC: 0.9 MG/DL (ref 0.1–1.5)
BUN SERPL-MCNC: 15 MG/DL (ref 8–22)
CALCIUM SERPL-MCNC: 9.4 MG/DL (ref 8.5–10.5)
CHLORIDE SERPL-SCNC: 109 MMOL/L (ref 96–112)
CO2 SERPL-SCNC: 24 MMOL/L (ref 20–33)
CREAT SERPL-MCNC: 0.85 MG/DL (ref 0.5–1.4)
EKG IMPRESSION: NORMAL
ERYTHROCYTE [DISTWIDTH] IN BLOOD BY AUTOMATED COUNT: 45.2 FL (ref 35.9–50)
GFR SERPL CREATININE-BSD FRML MDRD: >60 ML/MIN/1.73 M 2
GLOBULIN SER CALC-MCNC: 2.8 G/DL (ref 1.9–3.5)
GLUCOSE SERPL-MCNC: 101 MG/DL (ref 65–99)
HCT VFR BLD AUTO: 41.5 % (ref 42–52)
HGB BLD-MCNC: 14.1 G/DL (ref 14–18)
INR PPP: 1.04 (ref 0.87–1.13)
MCH RBC QN AUTO: 30.7 PG (ref 27–33)
MCHC RBC AUTO-ENTMCNC: 34 G/DL (ref 33.7–35.3)
MCV RBC AUTO: 90.2 FL (ref 81.4–97.8)
PLATELET # BLD AUTO: 183 K/UL (ref 164–446)
PMV BLD AUTO: 9.4 FL (ref 9–12.9)
POTASSIUM SERPL-SCNC: 3.7 MMOL/L (ref 3.6–5.5)
PROT SERPL-MCNC: 7 G/DL (ref 6–8.2)
PROTHROMBIN TIME: 13.9 SEC (ref 12–14.6)
RBC # BLD AUTO: 4.6 M/UL (ref 4.7–6.1)
SODIUM SERPL-SCNC: 139 MMOL/L (ref 135–145)
WBC # BLD AUTO: 5.5 K/UL (ref 4.8–10.8)

## 2017-08-10 PROCEDURE — 700101 HCHG RX REV CODE 250

## 2017-08-10 PROCEDURE — 99152 MOD SED SAME PHYS/QHP 5/>YRS: CPT

## 2017-08-10 PROCEDURE — 85610 PROTHROMBIN TIME: CPT

## 2017-08-10 PROCEDURE — 304952 HCHG R 2 PADS

## 2017-08-10 PROCEDURE — 93005 ELECTROCARDIOGRAM TRACING: CPT | Performed by: INTERNAL MEDICINE

## 2017-08-10 PROCEDURE — 700111 HCHG RX REV CODE 636 W/ 250 OVERRIDE (IP)

## 2017-08-10 PROCEDURE — 99153 MOD SED SAME PHYS/QHP EA: CPT

## 2017-08-10 PROCEDURE — 304853 HCHG PPM TEST CABLE

## 2017-08-10 PROCEDURE — 305387 HCHG SUTURES

## 2017-08-10 PROCEDURE — G0378 HOSPITAL OBSERVATION PER HR: HCPCS

## 2017-08-10 PROCEDURE — 85027 COMPLETE CBC AUTOMATED: CPT

## 2017-08-10 PROCEDURE — 93010 ELECTROCARDIOGRAM REPORT: CPT | Performed by: INTERNAL MEDICINE

## 2017-08-10 PROCEDURE — 33208 INSRT HEART PM ATRIAL & VENT: CPT

## 2017-08-10 PROCEDURE — 700111 HCHG RX REV CODE 636 W/ 250 OVERRIDE (IP): Performed by: INTERNAL MEDICINE

## 2017-08-10 PROCEDURE — 96365 THER/PROPH/DIAG IV INF INIT: CPT

## 2017-08-10 PROCEDURE — C1898 LEAD, PMKR, OTHER THAN TRANS: HCPCS

## 2017-08-10 PROCEDURE — C1894 INTRO/SHEATH, NON-LASER: HCPCS

## 2017-08-10 PROCEDURE — C1892 INTRO/SHEATH,FIXED,PEEL-AWAY: HCPCS

## 2017-08-10 PROCEDURE — C1785 PMKR, DUAL, RATE-RESP: HCPCS

## 2017-08-10 PROCEDURE — 80053 COMPREHEN METABOLIC PANEL: CPT

## 2017-08-10 PROCEDURE — A9270 NON-COVERED ITEM OR SERVICE: HCPCS | Performed by: INTERNAL MEDICINE

## 2017-08-10 PROCEDURE — 700102 HCHG RX REV CODE 250 W/ 637 OVERRIDE(OP): Performed by: INTERNAL MEDICINE

## 2017-08-10 PROCEDURE — 71010 DX-CHEST-PORTABLE (1 VIEW): CPT

## 2017-08-10 RX ORDER — CEFAZOLIN SODIUM 1 G/3ML
INJECTION, POWDER, FOR SOLUTION INTRAMUSCULAR; INTRAVENOUS
Status: COMPLETED
Start: 2017-08-10 | End: 2017-08-10

## 2017-08-10 RX ORDER — ACETAMINOPHEN 500 MG
1000 TABLET ORAL EVERY 6 HOURS
Status: DISCONTINUED | OUTPATIENT
Start: 2017-08-10 | End: 2017-08-11 | Stop reason: HOSPADM

## 2017-08-10 RX ORDER — OXYCODONE HYDROCHLORIDE 5 MG/1
2.5 TABLET ORAL
Status: DISCONTINUED | OUTPATIENT
Start: 2017-08-10 | End: 2017-08-11 | Stop reason: HOSPADM

## 2017-08-10 RX ORDER — LIDOCAINE HYDROCHLORIDE 20 MG/ML
INJECTION, SOLUTION INFILTRATION; PERINEURAL
Status: COMPLETED
Start: 2017-08-10 | End: 2017-08-10

## 2017-08-10 RX ORDER — MIDAZOLAM HYDROCHLORIDE 1 MG/ML
INJECTION INTRAMUSCULAR; INTRAVENOUS
Status: COMPLETED
Start: 2017-08-10 | End: 2017-08-10

## 2017-08-10 RX ORDER — BUPIVACAINE HYDROCHLORIDE 2.5 MG/ML
INJECTION, SOLUTION EPIDURAL; INFILTRATION; INTRACAUDAL
Status: COMPLETED
Start: 2017-08-10 | End: 2017-08-10

## 2017-08-10 RX ADMIN — CEFAZOLIN 2000 MG: 1 INJECTION, POWDER, FOR SOLUTION INTRAVENOUS at 09:05

## 2017-08-10 RX ADMIN — LIDOCAINE HYDROCHLORIDE: 20 INJECTION, SOLUTION INFILTRATION; PERINEURAL at 09:05

## 2017-08-10 RX ADMIN — FENTANYL CITRATE 100 MCG: 50 INJECTION, SOLUTION INTRAMUSCULAR; INTRAVENOUS at 09:05

## 2017-08-10 RX ADMIN — CEFAZOLIN SODIUM 1 G: 1 INJECTION, SOLUTION INTRAVENOUS at 16:37

## 2017-08-10 RX ADMIN — MIDAZOLAM 2 MG: 1 INJECTION INTRAMUSCULAR; INTRAVENOUS at 09:05

## 2017-08-10 RX ADMIN — BUPIVACAINE HYDROCHLORIDE: 2.5 INJECTION, SOLUTION EPIDURAL; INFILTRATION; INTRACAUDAL; PERINEURAL at 09:05

## 2017-08-10 ASSESSMENT — COGNITIVE AND FUNCTIONAL STATUS - GENERAL
DAILY ACTIVITIY SCORE: 24
SUGGESTED CMS G CODE MODIFIER MOBILITY: CH
MOBILITY SCORE: 24
SUGGESTED CMS G CODE MODIFIER DAILY ACTIVITY: CH

## 2017-08-10 ASSESSMENT — LIFESTYLE VARIABLES
CONSUMPTION TOTAL: NEGATIVE
EVER HAD A DRINK FIRST THING IN THE MORNING TO STEADY YOUR NERVES TO GET RID OF A HANGOVER: NO
HOW MANY TIMES IN THE PAST YEAR HAVE YOU HAD 5 OR MORE DRINKS IN A DAY: 0
ALCOHOL_USE: NO
ON A TYPICAL DAY WHEN YOU DRINK ALCOHOL HOW MANY DRINKS DO YOU HAVE: 0
TOTAL SCORE: 0
TOTAL SCORE: 0
AVERAGE NUMBER OF DAYS PER WEEK YOU HAVE A DRINK CONTAINING ALCOHOL: 0
HAVE YOU EVER FELT YOU SHOULD CUT DOWN ON YOUR DRINKING: NO
TOTAL SCORE: 0
EVER FELT BAD OR GUILTY ABOUT YOUR DRINKING: NO
HAVE PEOPLE ANNOYED YOU BY CRITICIZING YOUR DRINKING: NO
EVER_SMOKED: YES

## 2017-08-10 ASSESSMENT — PAIN SCALES - GENERAL
PAINLEVEL_OUTOF10: 0

## 2017-08-10 ASSESSMENT — PATIENT HEALTH QUESTIONNAIRE - PHQ9
2. FEELING DOWN, DEPRESSED, IRRITABLE, OR HOPELESS: NOT AT ALL
SUM OF ALL RESPONSES TO PHQ QUESTIONS 1-9: 0
SUM OF ALL RESPONSES TO PHQ9 QUESTIONS 1 AND 2: 0
1. LITTLE INTEREST OR PLEASURE IN DOING THINGS: NOT AT ALL

## 2017-08-10 NOTE — CARE PLAN
Problem: Safety  Goal: Will remain free from injury  Outcome: PROGRESSING AS EXPECTED    Problem: Infection  Goal: Will remain free from infection  Outcome: PROGRESSING AS EXPECTED    Problem: Bowel/Gastric:  Goal: Normal bowel function is maintained or improved  Outcome: PROGRESSING AS EXPECTED    Problem: Knowledge Deficit  Goal: Knowledge of disease process/condition, treatment plan, diagnostic tests, and medications will improve  Outcome: PROGRESSING AS EXPECTED

## 2017-08-10 NOTE — IP AVS SNAPSHOT
8/11/2017    Abel [place] [address]    Dear Abel:    [facility] wants to ensure your discharge home is safe and you or your loved ones have had all of your questions answered regarding your care after you leave the hospital.    Below is a list of resources and contact information should you have any questions regarding your hospital stay, follow-up instructions, or active medical symptoms.    Questions or Concerns Regarding… Contact   Medical Questions Related to Your Discharge  (7 days a week, 8am-5pm) Contact a Nurse Care Coordinator   [phone]   Medical Questions Not Related to Your Discharge  (24 hours a day / 7 days a week)  Contact the Nurse Health Line   [phone]    Medications or Discharge Instructions Refer to your discharge packet   or contact your [facility] Primary Care Provider   [phone]   Follow-up Appointment(s) Schedule your appointment via [employer]   or contact Scheduling [phone]   Billing Review your statement via [employer]  or contact Billing [phone]   Medical Records Review your records via [employer]   or contact Medical Records [phone]     You may receive a telephone call within two days of discharge. This call is to make certain you understand your discharge instructions and have the opportunity to have any questions answered. You can also easily access your medical information, test results and upcoming appointments via the [employer] free online health management tool. You can learn more and sign up at [email or web address]. For assistance setting up your [employer] account, please call [phone].    Once again, we want to ensure your discharge home is safe and that you have a clear understanding of any next steps in your care. If you have any questions or concerns, please do not hesitate to contact us, we are here for you. Thank you for choosing [facility] for your healthcare needs.    Sincerely,    Your [facility] Healthcare Team

## 2017-08-10 NOTE — IP AVS SNAPSHOT
" <p align=\"LEFT\"><IMG SRC=\"//[email or web address]\" alt=\"Image\" WIDTH=\"50%\" HEIGHT=\"200\" BORDER=\"\"></p>                   Name:Abel Andersen  Medical Record Number:[id number]  CSN: [id number]    YOB: 1950   Age: 67 y.o.  Sex: male  HT:1.676 m (5' 6\") WT: 73.1 kg (161 lb 2.5 oz)          Admit Date: 8/10/2017     Discharge Date:   Today's Date: 8/11/2017  Attending Doctor:  Jose M Wagoner M.D.                  Allergies:  Nkda          Your appointments     Aug 18, 2017  3:15 PM   PACER CHECK ONLY with PACER CHECK-[facility] 2   [facility] (--)    [address]  [place] [id number] [phone]              Follow-up Information     1. Follow up with Jose M Wagoner M.D. In 1 week.    Specialty:  Cardiac Electrophysiology    Contact information    [address]  [address]  [place] [address] [phone]          2. Follow up with AMMON Vickers. Call in 1 day.    Specialty:  Family Medicine    Why:  Please call for follow up appointment in 1 week.    Contact information    [address]  [address]  [place] [address]  [phone]          3. Follow up with Cardiology. Call in 1 day.    Why:  Please call for follow up appointment in 1 week.         Medication List      Take these Medications        Instructions    aspirin 81 MG tablet    Take 81 mg by mouth every day.   Dose:  81 mg       atorvastatin 20 MG Tabs   What changed:  Another medication with the same name was removed. Continue taking this medication, and follow the directions you see here.   Commonly known as:  LIPITOR    Take 1 Tab by mouth every day.   Dose:  20 mg       doxazosin 1 MG Tabs   Commonly known as:  CARDURA        metoprolol SR 50 MG Tb24   Commonly known as:  TOPROL XL        NITROSTAT 0.4 MG Subl   Generic drug:  nitroglycerin    Place 0.4 mg under tongue every 5 minutes as needed.   Dose:  0.4 mg       sotalol 80 MG Tabs   What changed:  how much to take   "   Commonly known as:  BETAPACE    Take 1 Tab by mouth 2 times a day.   Dose:  80 mg

## 2017-08-10 NOTE — PROGRESS NOTES
Assumed care of patient. Bedside report received from Cindy updated on POC, call light within reach and fall precautions in place. Bed locked and in lowest position. Patient instructed to call for assistance before getting out of bed. Right shoulder incision from pacemaker placement, dressing CDI. Patient refused pain meds at this time. Pain 3/10. Lunch ordered for patient. All questions answered, no other needs at this time.

## 2017-08-10 NOTE — IP AVS SNAPSHOT
" Home Care Instructions                                                                                                                  Name:Abel Andersen  Medical Record Number:[id number]  CSN: [id number]    YOB: 1950   Age: 67 y.o.  Sex: male  HT:1.676 m (5' 6\") WT: 73.1 kg (161 lb 2.5 oz)          Admit Date: 8/10/2017     Discharge Date:   Today's Date: 8/11/2017  Attending Doctor:  Jose M Wagoner M.D.                  Allergies:  Nkda            Discharge Instructions       Discharge Instructions    Discharged to home by car with relative. Discharged via wheelchair, hospital escort: Yes.  Special equipment needed: Not Applicable    Be sure to schedule a follow-up appointment with your primary care doctor or any specialists as instructed.     Discharge Plan:   Influenza Vaccine Indication: Patient Refuses    I understand that a diet low in cholesterol, fat, and sodium is recommended for good health. Unless I have been given specific instructions below for another diet, I accept this instruction as my diet prescription.   Other diet: Cardiac    Special Instructions: No lifting over 5 # with the left arm. No above the head activities with the left arm. Immobilizer at night. [facility]up [facility] in 1 week.   Report any swelling or redness at the site.     · Is patient discharged on Warfarin / Coumadin?   No     · Is patient Post Blood Transfusion?  No    Depression / Suicide Risk    As you are discharged from this [facility] facility, it is important to learn how to keep safe from harming yourself.    Recognize the warning signs:  · Abrupt changes in personality, positive or negative- including increase in energy   · Giving away possessions  · Change in eating patterns- significant weight changes-  positive or negative  · Change in sleeping patterns- unable to sleep or sleeping all the time   · Unwillingness or inability to communicate  · Depression  · Unusual sadness, discouragement and " loneliness  · Talk of wanting to die  · Neglect of personal appearance   · Rebelliousness- reckless behavior  · Withdrawal from people/activities they love  · Confusion- inability to concentrate     If you or a loved one observes any of these behaviors or has concerns about self-harm, here's what you can do:  · Talk about it- your feelings and reasons for harming yourself  · Remove any means that you might use to hurt yourself (examples: pills, rope, extension cords, firearm)  · Get professional help from the community (Mental Health, Substance Abuse, psychological counseling)  · Do not be alone:Call your Safe Contact- someone whom you trust who will be there for you.  · Call your local CRISIS HOTLINE 089-6393 or 620-073-0330  · Call your local Children's Mobile Crisis Response Team [place] (894) 804-6832 or www.TruQu  · Call the toll free National Suicide Prevention Hotlines   · National Suicide Prevention Lifeline 110-268-PFCR (0561)  · National [employer] Line Network 800-SUICIDE (079-2180)    Implante de un marcapasos, cuidados posteriores  (Pacemaker Implantation, Care After)  Siga estas instrucciones wallace las próximas semanas. Estas indicaciones le proporcionan información general acerca de cómo deberá cuidarse después del procedimiento. El médico también podrá darle instrucciones más específicas. El tratamiento palumbo sido planificado según las prácticas médicas actuales, lis en algunos casos pueden ocurrir problemas. Comuníquese con el médico si tiene algún problema o alguna pregunta sobre el marcapasos.   QUÉ ESPERAR DESPUÉS DEL PROCEDIMIENTO  · Es posible que sienta dolor. Es normal sentir un poco de dolor. Puede durar unos pocos días.  · Quizá note un leve bulto en la piel donde se colocó el dispositivo. A veces, es posible sentir el dispositivo debajo de la piel. [place] es normal.  · Faria médico controlará el dispositivo, los conductores y la batería periódicamente wallace los meses y años  posteriores. Con el tiempo, cuando la batería esté baja, el dispositivo se reemplazará.  INSTRUCCIONES PARA EL CUIDADO EN EL HOGAR  Medicamentos:  · [place] los medicamentos solamente corey se lo haya indicado el médico.  · Si le recetaron antibióticos, asegúrese de terminarlos, incluso si comienza a sentirse mejor.  · No tome ningún otro medicamento sin consultar antes a sheriff médico. Algunos medicamentos, corey ciertos analgésicos, pueden causar sangrado en el estómago después de la cirugía.  Cuidados de la herida  · No se quite la venda del pecho hasta que se lo indique el médico.  · Después de quitarse la venda, podrá notar que tiene trozos de cinta en la piel denominados tiras adhesivas sobre la sakina donde se realizó el paulette (lugar de la incisión). Deje que se caigan por sí solas.  · Revise el lugar de la incisión todos los melissa para asegurarse de que no esté infectado, sangrando, o comience a separarse.  · No use lociones o ungüentos cerca del sitio de la incisión, excepto si se lo indican.  · Mantenga limpio y seco el lugar de la incisión wallace 2 a 3 días después del procedimiento, o según lo que le indique el médico. La incisión puede demorar algunas semanas para que se cure completamente.  · No tome todd de inmersión, no nade ni utilice el jacuzzi hasta que el médico lo autorice.  Actividades  · Trate de caminar un poco todos los días. El ejercicio es importante después de jane procedimiento. También es importante usar el hombro del lado del marcapasos en las tareas diarias que no requieran un movimiento exagerado.  · Evite los movimientos bruscos, corey jalar o cortar, que lo adan separar el brazo del cuerpo, wallace al menos 6 semanas.  · No levante la parte superior del brazo por encima de los hombros wallace al menos 6 semanas. [place] significa que no debe jugar al tenis, al golf ni practicar natación wallace jane tiempo. Si duerme con el brazo por arriba de sheriff aleja, use reta restricción para evitar que esto  suceda mientras duerme.  · Puede volver a trabajar cuando faria médico lo autorice. Consulte a faria médico antes de empezar a conducir o a hacer deporte.  Otras indicaciones  · Siga las instrucciones de la dieta si se las proporcionan. Debe ser capaz de comer lo que habitualmente come de inmediato, lis puede que tenga que limitar el consumo de sal.  · Controle faria peso a diario. Si aumenta el peso de manera súbita, puede ser que esté acumulando líquido en el organismo.  · Siempre lleve con usted la tarjeta de identificación del marcapasos. En la tarjeta de identificación deben consignarse la fecha del implante, el modelo del dispositivo y el fabricante. Considere usar reta pulsera o un collar de alerta médica.  · Informe a todos los médicos que tiene un marcapasos. [place] se hace para evitar que le adan estudios de imágenes por resonancia magnética (MRI), debido a que se utilizan potentes imanes wallace la prueba.  · Si tiene que pasar por un detector de metales, atraviéselo rápidamente. No se detenga debajo del detector ni permanezca cerca de él.  · Evite los lugares u objetos donde existe un dian johana eléctrico o magnético, incluyendo:  ¨ Las lachelle de seguridad de los aeropuertos. Cuando esté en el aeropuerto, informe a los oficiales que tiene un marcapasos. Faria tarjeta de identificación le permitirá registrarse de manera wright y sin dañar el marcapasos. Además, no permita que el personal de seguridad pase la vara magnética cerca del marcapasos. Eso puede hacer que deje de funcionar.  ¨ Plantas de energía.  ¨ Generadores eléctricos grandes.  ¨ Brower de transmisión de radiofrecuencia, corey brower de teléfonos celulares y radio.  · No utilice equipos de radio amateur (radioaficionado) o antorchas de soldadura eléctrica (arco). Es seguro usar algunos dispositivos si se mantienen a, por lo menos, 1 pie (30 cm) de distancia del marcapasos. Estos incluyen herramientas eléctricas, cortadoras de césped y altavoces. Si benji  que algo puede no ser seguro, consulte a sheriff médico.  · Puede usar de forma wright mantas eléctricas, cojines de calefacción, computadoras y hornos de microondas.  · Cuando utilice el teléfono celular, sosténgalo en el oído contrario a donde tiene colocado el marcapasos. No guarde el teléfono celular en un bolsillo sobre el marcapasos.  · Concurra a todas las visitas de control corey se lo haya indicado el médico. Esta es la forma en la que sheriff médico se asegura de que el tórax esté cicatrizando correctamente. Pregúntele a sheriff médico cuándo debe volver para que le saquen los puntos de sutura o las grapas.  · [place] controlar el marcapasos cada 3 a 6 meses o según las indicaciones del médico. La mayoría de los marcapasos ramos entre 4 y 8 años antes de que necesite olivier nuevo.  SOLICITE ATENCIÓN MÉDICA SI:  · Aumenta de peso de forma repentina.  · Las piernas o los pies se le hinchan más de lo habitual.  · Siente corey si el corazón aleteara o que los latidos son intermitentes (palpitaciones).  · Tiene fiebre.  SOLICITE ATENCIÓN MÉDICA DE INMEDIATO SI:  · Siente dolor en el pecho.  · Le shoaib respirar más que antes.  · Tiene más sensación de desvanecimiento que antes.  · Tiene problemas en el lugar de la incisión, corey hinchazón o sangrado, o la incisión comienza a abrirse.  · Hay secreción, enrojecimiento, hinchazón o dolor en el lugar de la incisión.     Esta información no tiene corey fin reemplazar el consejo del médico. Asegúrese de hacerle al médico cualquier pregunta que tenga.     Document Released: 05/06/2010 Document Revised: 01/08/2016  Elsevier Interactive Patient Education ©2016 Elsevier Inc.    Implantación de un marcapasos  (Pacemaker Implantation)  El corazón tiene sheriff propio sistema eléctrico o marcapasos natural para regular los latidos cardíacos. A veces, therese marcapasos natural blayne y hace que el ritmo del corazón sea muy lento. Si esto sucede, podrán implantarle un marcapasos quirúrgicamente para que los  latidos del corazón tengan un ritmo normal o programado. Un marcapasos es un dispositivo pequeño, alimentado por reta batería, que se coloca debajo de la piel y se programa para sentir abigail latidos cardíacos. Si la frecuencia cardíaca es inferior a la tasa programada, el marcapasos marcará el ritmo de los latidos del corazón. Las partes del marcapasos son las siguientes:  · Cables o electrodos. Los electrodos se colocan en el corazón y transmiten energía eléctrica. Los electrodos están conectados al generador de impulsos.  · Generador de impulsos. El generador de impulsos contiene reta computadora y un sistema de memoria. Produce la señal eléctrica que provoca el latido cardíaco.  Se le colocará un marcapasos si:  · Sheriff ritmo cardíaco es muy lento (bradicardia).  · Se desmaya (síncope).  · Le falta el aire (disnea) debido a los problemas cardíacos.  INFORME A SHERIFF MÉDICO:  · Cualquier alergia que tenga.  · Todos los medicamentos que utiliza, incluidos vitaminas, hierbas, gotas oftálmicas, cremas y medicamentos de venta luci.  · Problemas previos que usted o los miembros de sheriff thelma hayan tenido con el uso de anestésicos.  · Enfermedades de la marco a que tenga.  · Cirugías previas.  · Enfermedades que tenga.  · Posibilidad de embarazo, si corresponde.  RIESGOS Y COMPLICACIONES  Generalmente, el implante de un marcapasos es un procedimiento seguro. Sin embargo, pueden ocurrir algunos problemas, por ejemplo:  · Hemorragia.  · No se puede colocar el marcapasos bajo sedación local.  · Infección.  ANTES DEL PROCEDIMIENTO  · Le harán un análisis de marco a antes del procedimiento.  · No consuma ningún producto que contenga tabaco, corey cigarrillos, tabaco de mascar o cigarrillos electrónicos. Si necesita ayuda para dejar de consumir tabaco, hable con el médico.  · No coma ni lenore nada después de la medianoche anterior al procedimiento o según le haya indicado sheriff médico.  · Consulte a sheriff médico acerca de estos temas:  ¨ Cambio  o interrupción de los medicamentos que cornelius habitualmente. [place] es muy importante si cornelius medicamentos para la diabetes o anticoagulantes.  ¨ Medicamentos corey aspirina e ibuprofeno. Estos medicamentos pueden diluir la marco a. No tome estos medicamentos antes del procedimiento si se lo indica el médico.  · Consulte al médico si en la mañana del procedimiento puede mauricio los medicamentos aprobados con un sorbo de agua.  PROCEDIMIENTO   Se considera que la cirugía de colocación de un marcapasos es un procedimiento quirúrgico mínimamente invasivo. Se realiza con un anestésico local, que es reta inyección en el lugar de la incisión que adormece la piel. También le administrarán sedantes y analgésicos que le producirán sueño wallace el procedimiento.   · Le colocarán reta vía intravenosa (IV) en la mano o el brazo para administrarle los sedantes y analgésicos wallace el procedimiento de colocación del marcapasos.  · Le inyectarán un anestésico en la piel donde se colocará el marcapasos. Luego, le harán reta pequeña incisión en la piel. El marcapasos se coloca debajo de la piel, cerca de la clavícula.  · Reta vez hecha la incisión, los electrodos se insertarán en reta vena giovanna y se guiarán hasta el corazón mediante reta radiografía.  · A través de la misma incisión que se usó para colocar los electrodos, se creará un pequeño bolsillo debajo de la piel para sostener el generador de impulsos. Los electrodos luego se conectarán al generador de impulsos.  · Luego, se cerrará el lugar de la incisión. Se colocará un vendaje (apósito) sobre el lugar del marcapasos. [place] vendaje se quitará después de 24 a 48 horas.  DESPUÉS DEL PROCEDIMIENTO  · Después de implantar el marcapasos lo llevarán a un área de recuperación. Controlarán abigail signos vitales, corey la presión arterial, la frecuencia cardíaca, la respiración y los niveles de oxígeno.  · Le tomarán reta radiografía de tórax después de haberle implantado el marcapasos. [place] es  realiza para garantizar que el marcapasos y los electrodos estén en el lugar correcto.     Esta información no tiene corey fin reemplazar el consejo del médico. Asegúrese de hacerle al médico cualquier pregunta que tenga.     Document Released: 09/27/2006 Document Revised: 01/08/2016  [employer] Interactive Patient Education ©2016 [employer]      Your appointments     Aug 18, 2017  3:15 PM   PACER CHECK ONLY with PACER CHECK-[facility] B 2   [facility] (--)    [address]  [place] [id number] [phone]              Follow-up Information     1. Follow up with Jose M Wagoner M.D. In 1 week.    Specialty:  Cardiac Electrophysiology    Contact information    [address]  [address]  [place] [id number] [phone]          2. Follow up with AMMON Vickers. Call in 1 day.    Specialty:  Family Medicine    Why:  Please call for follow up appointment in 1 week.    Contact information    [address]  [address]  [place] [address]  [phone]          3. Follow up with Cardiology. Call in 1 day.    Why:  Please call for follow up appointment in 1 week.         Discharge Medication Instructions:    Below are the medications your physician expects you to take upon discharge:    Review all your home medications and newly ordered medications with your doctor and/or pharmacist. Follow medication instructions as directed by your doctor and/or pharmacist.    Please keep your medication list with you and share with your physician.               Medication List      CHANGE how you take these medications        Instructions    Morning Afternoon Evening Bedtime    atorvastatin 20 MG Tabs   What changed:  Another medication with the same name was removed. Continue taking this medication, and follow the directions you see here.   Commonly known as:  LIPITOR   Next Dose Due:  Take tomorrow morning.          Take 1 Tab by mouth every day.   Dose:  20 mg                           sotalol 80 MG Tabs   What changed:  how much to take   Commonly known as:  BETAPACE   Next Dose Due:  Take this evening.          Take 1 Tab by mouth 2 times a day.   Dose:  80 mg                          CONTINUE taking these medications        Instructions    Morning Afternoon Evening Bedtime    aspirin 81 MG tablet   Next Dose Due:  Take tomorrow morning.          Take 81 mg by mouth every day.   Dose:  81 mg                        doxazosin 1 MG Tabs   Commonly known as:  CARDURA                             metoprolol SR 50 MG Tb24   Commonly known as:  TOPROL XL                             NITROSTAT 0.4 MG Subl   Generic drug:  nitroglycerin   Next Dose Due:  Take as needed.          Place 0.4 mg under tongue every 5 minutes as needed.   Dose:  0.4 mg                          STOP taking these medications     simvastatin 20 MG Tabs   Commonly known as:  ZOCOR                       Instructions           Diet / Nutrition:    Follow any diet instructions given to you by your doctor or the dietician, including how much salt (sodium) you are allowed each day.    If you are overweight, talk to your doctor about a weight reduction plan.    Activity:    Remain physically active following your doctor's instructions about exercise and activity.    Rest often.     Any time you become even a little tired or short of breath, SIT DOWN and rest.    Worsening Symptoms:    Report any of the following signs and symptoms to the doctor's office immediately:    *Pain of jaw, arm, or neck  *Chest pain not relieved by medication                               *Dizziness or loss of consciousness  *Difficulty breathing even when at rest   *More tired than usual                                       *Bleeding drainage or swelling of surgical site  *Swelling of feet, ankles, legs or stomach                 *Fever (>100ºF)  *Pink or blood tinged sputum  *Weight gain (3lbs/day or 5lbs /week)           *Shock from internal defibrillator  (if applicable)  *Palpitations or irregular heartbeats                *Cool and/or numb extremities    Stroke Awareness    Common Risk Factors for Stroke include:    Age  Atrial Fibrillation  Carotid Artery Stenosis  Diabetes Mellitus  Excessive alcohol consumption  High blood pressure  Overweight   Physical inactivity  Smoking    Warning signs and symptoms of a stroke include:    *Sudden numbness or weakness of the face, arm or leg (especially on one side of the body).  *Sudden confusion, trouble speaking or understanding.  *Sudden trouble seeing in one or both eyes.  *Sudden trouble walking, dizziness, loss of balance or coordination.Sudden severe headache with no known cause.    It is very important to get treatment quickly when a stroke occurs. If you experience any of the above warning signs, call 911 immediately.                   Disclaimer         Quit Smoking / Tobacco Use:    I understand the use of any tobacco products increases my chance of suffering from future heart disease or stroke and could cause other illnesses which may shorten my life. Quitting the use of tobacco products is the single most important thing I can do to improve my health. For further information on smoking / tobacco cessation call a Toll Free Quit Line at 1-124.300.9433 (*National Cancer [place]) or 1-142.222.9375 (American Lung Association) or you can access the web based program at www.lungusa.org.    [place] Tobacco Users Help Line:  (274) 877-3739       Toll Free: 1-432.369.2881  Quit Tobacco Program [facility] (470)425-1775    Crisis Hotline:    [place] Crisis Hotline:  3-068-QPTIHMV or 1-901.353.9048    [place] Crisis Hotline:    1-239.570.5340 or 554-490-6498    Discharge Survey:   Thank you for choosing [facility]. We hope we did everything we could to make your hospital stay a pleasant one. You may be receiving a phone survey and we would appreciate your time and participation in answering the  questions. Your input is very valuable to us in our efforts to improve our service to our patients and their families.        My signature on this form indicates that:    1. I have reviewed and understand the above information.  2. My questions regarding this information have been answered to my satisfaction.  3. I have formulated a plan with my discharge nurse to obtain my prescribed medications for home.                  Disclaimer         __________________________________                     __________       ________                       Patient Signature                                                 Date                    Time

## 2017-08-10 NOTE — OR SURGEON
Immediate Post-Operative Note      PreOp Diagnosis: sick sinus    PostOp Diagnosis: same    Procedure(s) :  Dual chamber ppm  Surgeon(s):  Jose M Wagoner M.D.    Type of Anesthesia: Moderate Sedation    Specimen: None    Estimated Blood Loss: 10 cc's    Contrast Media:  0 cc's    Fluoro Time: <5 min        Findings: boston mri compatible ppm.  Axillary access.  acitve leads    Complications: none apparent      Jose M Wagoner M.D.  8/10/2017 9:21 AM

## 2017-08-10 NOTE — IP AVS SNAPSHOT
Stat Doctors Access Code: [email or web address]  Expires: 8/31/2017  4:07 AM    Your email address is not on file at [facility].  Email Addresses are required for you to sign up for Stat Doctors, please contact [phone] to verify your personal information and to provide your email address prior to attempting to register for Stat Doctors.    Abel Duval [place] [place] [address]    Stat Doctors  A secure, online tool to manage your health information     [facility]’s Stat Doctors® is a secure, online tool that connects you to your personalized health information from the privacy of your home -- day or night - making it very easy for you to manage your healthcare. Once the activation process is completed, you can even access your medical information using the Stat Doctors meghana, which is available for free in the Apple Meghana store or Google Play store.     To learn more about Stat Doctors, visit [email or web address]    There are two levels of access available (as shown below):   My Chart Features  [facility] Primary Care Doctor [facility]  Specialists [facility]  Urgent  Care Non-[facility] Primary Care Doctor   Email your healthcare team securely and privately 24/7 X X X    Manage appointments: schedule your next appointment; view details of past/upcoming appointments X      Request prescription refills. X      View recent personal medical records, including lab and immunizations X X X X   View health record, including health history, allergies, medications X X X X   Read reports about your outpatient visits, procedures, consult and ER notes X X X X   See your discharge summary, which is a recap of your hospital and/or ER visit that includes your diagnosis, lab results, and care plan X X  X     How to register for Stat Doctors:  Once your e-mail address has been verified, follow the following steps to sign up for Stat Doctors.     1. Go to  [email or web address]  2. Click on the Sign Up Now box, which takes you to the New Member Sign Up page. You will  need to provide the following information:  a. Enter your LeanKit Access Code exactly as it appears at the top of this page. (You will not need to use this code after you’ve completed the sign-up process. If you do not sign up before the expiration date, you must request a new code.)   b. Enter your date of birth.   c. Enter your home email address.   d. Click Submit, and follow the next screen’s instructions.  3. Create a Magic Software Enterprisest ID. This will be your LeanKit login ID and cannot be changed, so think of one that is secure and easy to remember.  4. Create a LeanKit password. You can change your password at any time.  5. Enter your Password Reset Question and Answer. This can be used at a later time if you forget your password.   6. Enter your e-mail address. This allows you to receive e-mail notifications when new information is available in LeanKit.  7. Click Sign Up. You can now view your health information.    For assistance activating your LeanKit account, call [phone]

## 2017-08-10 NOTE — PROGRESS NOTES
2 RN skin check completed with [facility]. All skin intact, left shoulder surgical incision for pacer placement. Dressing CDI.

## 2017-08-11 ENCOUNTER — APPOINTMENT (OUTPATIENT)
Dept: RADIOLOGY | Facility: MEDICAL CENTER | Age: 67
End: 2017-08-11
Attending: INTERNAL MEDICINE
Payer: MEDICARE

## 2017-08-11 VITALS
SYSTOLIC BLOOD PRESSURE: 140 MMHG | HEIGHT: 66 IN | BODY MASS INDEX: 25.9 KG/M2 | RESPIRATION RATE: 20 BRPM | HEART RATE: 64 BPM | TEMPERATURE: 97.9 F | OXYGEN SATURATION: 96 % | WEIGHT: 161.16 LBS | DIASTOLIC BLOOD PRESSURE: 93 MMHG

## 2017-08-11 LAB — EKG IMPRESSION: NORMAL

## 2017-08-11 PROCEDURE — 93005 ELECTROCARDIOGRAM TRACING: CPT | Performed by: INTERNAL MEDICINE

## 2017-08-11 PROCEDURE — 93010 ELECTROCARDIOGRAM REPORT: CPT | Performed by: INTERNAL MEDICINE

## 2017-08-11 PROCEDURE — G0378 HOSPITAL OBSERVATION PER HR: HCPCS

## 2017-08-11 PROCEDURE — 71010 DX-CHEST-PORTABLE (1 VIEW): CPT

## 2017-08-11 RX ORDER — SOTALOL HYDROCHLORIDE 80 MG/1
80 TABLET ORAL 2 TIMES DAILY
Qty: 60 TAB | Refills: 3 | COMMUNITY
Start: 2017-08-11 | End: 2017-09-06 | Stop reason: SDUPTHER

## 2017-08-11 RX ORDER — ATORVASTATIN CALCIUM 20 MG/1
20 TABLET, FILM COATED ORAL DAILY
Qty: 30 TAB | COMMUNITY
Start: 2017-08-11 | End: 2017-11-13 | Stop reason: SDUPTHER

## 2017-08-11 ASSESSMENT — PAIN SCALES - GENERAL
PAINLEVEL_OUTOF10: 0
PAINLEVEL_OUTOF10: 0

## 2017-08-11 NOTE — PROGRESS NOTES
Report received. Pt care assumed. Assessment performed. Pt AOx4. Pt laying supine in bed. Pt denies pain and no signs of distress. Bed in low, locked position. Pt upself, steady. Call light within reach. Treaded socks on pt.  Hourly rounding in place.

## 2017-08-11 NOTE — CARE PLAN
Problem: Safety  Goal: Will remain free from falls  Outcome: PROGRESSING AS EXPECTED  Pt educated on the importance fall prevention methods, such as treaded sock and the bed alarm. Pt stated they will use the call light prior to any attempts of ambulation. Ambulatory ability assessed, treaded socks in place, bed locked and in low position, frequent trips to bathroom offered, and call light and phone within reach.    Problem: Knowledge Deficit  Goal: Knowledge of disease process/condition, treatment plan, diagnostic tests, and medications will improve  Outcome: PROGRESSING AS EXPECTED  Pt educated regarding plan of care and medications. All questions answered.

## 2017-08-11 NOTE — DISCHARGE SUMMARY
DISCHARGE DIAGNOSES:  1.  Tachybrady syndrome.  2.  Paroxysmal atrial fibrillation.  3.  Dyslipidemia.  4.  Palpitations.  5.  Chest pain.    HISTORY OF PRESENT HOSPITALIZATION:  The patient is a 67-year-old    male followed longitudinally by Dr. Nolan Granados in our office.  The patient   was referred back to Dr. Wagoner for bradycardia in conjunction to his sotalol   therapy for atrial fibrillation.  Sotalol had been reduced, but despite that,   he continued to have significant symptomatic bradycardia.  The patient was   admitted electively for permanent pacemaker implantation with Dr. Joes M Wagoner   on 08/10/2017.  Complications of the procedure were none.  Results of the   procedure were as follows:  The patient has a [employer] compatible   dual-chamber pacemaker implanted via the axillary access.  Evaluation of the   pacemaker this morning demonstrates continued excellent performance of the   lead system.  P-waves are measured at 2.5 mV, impedance 627 ohms and threshold   in the atrial lead at 0.4 volts.  R-waves are measured at 12.7 mV, impedance   1265 and threshold at 0.3 volts.  The patient's pacemaker site is   uncomplicated, minimal swelling, no ecchymosis noted, Tegaderm dressing is in   place.    DISCHARGE MEDICATIONS:  The patient will be discharged home on the following   medical regime.  He will go back up on his sotalol to 80 mg twice a day.  He   will take his Cardura 1 mg daily as previously directed, atorvastatin 20 mg   daily, metoprolol SR 50 mg daily, aspirin 81 mg daily, and he will continue   Lipitor 20 mg daily.  Simvastatin has been discontinued in the past.  He has   nitroglycerin p.r.n.    IMPRESSION:  1.  Tachybrady syndrome.  2.  Implantation of [employer] permanent dual-chamber pacemaker by Dr. Jose M Wagoner on 08/10/2017.  3.  Hyperlipidemia.  4.  Paroxysmal atrial fibrillation.  5.  Chest pain.    PLAN:  Patient will be discharged home.  Arm restricted activities were    reviewed.  He understands to keep the dressing on and dry and intact until he   is seen in the office in followup, that appointment has been arranged by my   office.  He will report certainly any swelling at the site or any concerns in   the interim.       ____________________________________     DIANA Morris / DIMAS    DD:  08/11/2017 10:24:56  DT:  08/11/2017 10:45:25    D#:  [id number]  Job#:  [id number]    cc: Jose M Wagoner MD

## 2017-08-11 NOTE — PROGRESS NOTES
Pt discharged to home with wife. Pt refused wheelchair/escort. Discharge teaching performed. Questions answered as needed. Discharge paperwork, prescriptions, and belongings home with pt.

## 2017-08-11 NOTE — PROGRESS NOTES
Assumed care at 1900. Bedside report received from Elizabeth. Patient's chart and MAR reviewed. Pt denies pain at this time. Pt is A & O 4. Pt received pacemaker today, left arm immobilized in sling; dressing cdi.  Patient was updated on plan of care for the day. Questions answered and concerns addressed.  Pt denies any additional needs at this time. White board updated. Call light, phone and personal belongings within reach.

## 2017-08-13 NOTE — PROCEDURES
DATE OF SERVICE:  08/10/2017    PROCEDURE:  Dual chamber pacemaker implantation.    INDICATION FOR PROCEDURE:  Sick sinus syndrome with tachycardia and   bradycardia.    PROCEDURE IN DETAIL:  After obtaining informed consent, patient was brought to   catheterization laboratory in the fasting state.  He was prepped and draped   in the usual sterile fashion.  He received IV antibiotics prior to incision.    He received conscious sedation with midazolam and fentanyl.  We began by   anesthetizing the left deltopectoral area with mix of lidocaine and   bupivacaine and made a 4 cm incision, dissected down to the prepectoral   fascia.  On the prepectoral fascia, I used micro puncture to access the medial   left axillary vein.  Two wires were placed on the IVCs, 2 wires _____ 2   sheaths.  First sheath was used for [employer] right ventricular lead   that his MRI conditional, serial #[id number].  This was placed in the RV apex   where the R waves of 9.6 volts threshold 0.8 volts at 0.4 msec and pacing   impedance 1180 ohms.  Second sheath was used for right atrial lead.  This was   also [employer] lead serial #[id number].  This was placed in the right   atrial appendage and screwed in the right atrial appendage and had P waves of   1.5 millivolts, threshold 0.9 volts at 0.5 milliseconds, pacing impedance of   560 ohms with good sensing and pacing parameters on both leads.  The sheaths   were slit and the lead was sutured down to the prepectoral fascia.  A pocket   was made and rinsed with copious amount of antibiotic solution.  Two leads   were then connected to a [employer] MRI conditional pacemaker generator   with serial #[id number].  This generator was then placed in the pocket with leads   underneath.  The pocket was closed in 3 layers device is programmed, DDDR   .    CONCLUSION:  Successful implantation of a dual chamber pacemaker system.       ____________________________________     Jose M Wagoner  MD HNENING / DIMAS    DD:  08/13/2017 09:30:20  DT:  08/13/2017 09:44:01    D#:  [id number]  Job#:  [id number]

## 2017-08-15 ENCOUNTER — TELEPHONE (OUTPATIENT)
Dept: CARDIOLOGY | Facility: MEDICAL CENTER | Age: 67
End: 2017-08-15

## 2017-08-15 NOTE — TELEPHONE ENCOUNTER
"S/W PT in Danish; he reports that the zone in his chest where the skin was scrubbed had a mild rash. He said that he took a shower and it helped \"rash is almost gone\". He protected the dressing during shower. We discussed the care of the dressing and he says that at this moment dressing is dry and intact, he denies redness or pain around site.  PT educated about not showering until appt and to not remove dressing. PT has his pacer check visit Friday the 18th. He verbalized understanding of instructions, all questions answered.  Flory HUTTON RN      ----- Message -----     From: Angelique Rainey R.N.     Sent: 8/15/2017   9:14 AM       To: Angelique Rainey R.N.  Subject: FW: Pacemaker questions                              ----- Message -----     From: Lisa Miranda, Med Ass't     Sent: 8/14/2017   8:56 AM       To: Jennifer Ren R.N.  Subject: Pacemaker questions                              VARGAS Rodriguez,    Pt called and stated that he had just had a PMC put on. He wants to know how long he needs to keep on the gauze on for his wound.     He also wants to know if it is normal to have a rash on his chest as well.    He would like a call back at: [phone]. He will need a Slovak interpretor.    Thank you so much,    Lisa    "

## 2017-08-18 ENCOUNTER — NON-PROVIDER VISIT (OUTPATIENT)
Dept: CARDIOLOGY | Facility: MEDICAL CENTER | Age: 67
End: 2017-08-18
Payer: MEDICARE

## 2017-08-18 DIAGNOSIS — I49.5 SICK SINUS SYNDROME (HCC): ICD-10-CM

## 2017-08-18 DIAGNOSIS — Z95.0 CARDIAC PACEMAKER IN SITU: ICD-10-CM

## 2017-08-18 PROCEDURE — 93280 PM DEVICE PROGR EVAL DUAL: CPT | Performed by: INTERNAL MEDICINE

## 2017-08-18 NOTE — MR AVS SNAPSHOT
Abel Andersen   2017 3:15 PM   Appointment   MRN: [id number]    Department:  [facility]   Dept Phone:  [phone]    Description:  Male : 1950   Provider:  [facility]           Allergies as of 2017     Allergen Noted Reactions    Nkda [No Known Drug Allergy] 10/01/2012         Vital Signs     Smoking Status                   Former Smoker           Basic Information     Date Of Birth Sex Race Ethnicity Preferred Language    1950 Male  or   Origin (Andorran,Montserratian,St Lucian,Ghanaian, etc) English      Your appointments     Sep 27, 2017  3:45 PM   PACER CHECK ONLY with [facility]   [facility] (--)    [address], [address]  [place] [id number] [phone]              Problem List              ICD-10-CM Priority Class Noted - Resolved    Paroxysmal atrial fibrillation (CMS-HCC) I48.0 Medium  2012 - Present    CHEST PAIN    2012 - Present    Palpitations R00.2   2012 - Present    Dyslipidemia E78.5 Medium  2012 - Present    Bradycardia R00.1   8/10/2017 - Present      Health Maintenance        Date Due Completion Dates    IMM DTaP/Tdap/Td Vaccine (1 - Tdap) 1969 ---    COLONOSCOPY 2000 ---    IMM ZOSTER VACCINE 2010 ---    IMM PNEUMOCOCCAL 65+ (ADULT) LOW/MEDIUM RISK SERIES (1 of 2 - PCV13) 2015 ---    IMM INFLUENZA (1) 2017 ---            Current Immunizations     No immunizations on file.      Below and/or attached are the medications your provider expects you to take. Review all of your home medications and newly ordered medications with your provider and/or pharmacist. Follow medication instructions as directed by your provider and/or pharmacist. Please keep your medication list with you and share with your provider. Update the information when medications are discontinued, doses are changed, or new medications (including over-the-counter products) are  added; and carry medication information at all times in the event of emergency situations     Allergies:  NKDA - (reactions not documented)               Medications  Valid as of: August 18, 2017 -  3:33 PM    Generic Name Brand Name Tablet Size Instructions for use    Aspirin (Tab) aspirin 81 MG Take 81 mg by mouth every day.        Atorvastatin Calcium (Tab) LIPITOR 20 MG Take 1 Tab by mouth every day.        Doxazosin Mesylate (Tab) CARDURA 1 MG         Metoprolol Succinate (TABLET SR 24 HR) TOPROL XL 50 MG         Nitroglycerin (SL Tab) NITROSTAT 0.4 MG Place 0.4 mg under tongue every 5 minutes as needed.        Sotalol HCl (Tab) BETAPACE 80 MG Take 1 Tab by mouth 2 times a day.        .                 Medicines prescribed today were sent to:     [facility] [address] [place] [address] - [address]     [address] [facility] [address]    Phone: [phone] Fax: [phone]    Open 24 Hours?: No      Medication refill instructions:       If your prescription bottle indicates you have medication refills left, it is not necessary to call your provider’s office. Please contact your pharmacy and they will refill your medication.    If your prescription bottle indicates you do not have any refills left, you may request refills at any time through one of the following ways: The online NetzVacation system (except Urgent Care), by calling your provider’s office, or by asking your pharmacy to contact your provider’s office with a refill request. Medication refills are processed only during regular business hours and may not be available until the next business day. Your provider may request additional information or to have a follow-up visit with you prior to refilling your medication.   *Please Note: Medication refills are assigned a new Rx number when refilled electronically. Your pharmacy may indicate that no refills were authorized even though a new prescription for the same medication is available at the pharmacy. Please  request the medicine by name with the pharmacy before contacting your provider for a refill.           Oportunista Access Code: [email or web address]  Expires: 8/31/2017  4:07 AM    Oportunista  A secure, online tool to manage your health information     [employer]’s Oportunista® is a secure, online tool that connects you to your personalized health information from the privacy of your home -- day or night - making it very easy for you to manage your healthcare. Once the activation process is completed, you can even access your medical information using the Oportunista meghana, which is available for free in the Apple Meghana store or Google Play store.     Oportunista provides the following levels of access (as shown below):   My Chart Features   [facility] Primary Care Doctor [facility]  Specialists [facility]  Urgent  Care Non-[facility]  Primary Care  Doctor   Email your healthcare team securely and privately 24/7 X X X    Manage appointments: schedule your next appointment; view details of past/upcoming appointments X      Request prescription refills. X      View recent personal medical records, including lab and immunizations X X X X   View health record, including health history, allergies, medications X X X X   Read reports about your outpatient visits, procedures, consult and ER notes X X X X   See your discharge summary, which is a recap of your hospital and/or ER visit that includes your diagnosis, lab results, and care plan. X X       How to register for Oportunista:  1. Go to  [email or web address].  2. Click on the Sign Up Now box, which takes you to the New Member Sign Up page. You will need to provide the following information:  a. Enter your Oportunista Access Code exactly as it appears at the top of this page. (You will not need to use this code after you’ve completed the sign-up process. If you do not sign up before the expiration date, you must request a new code.)   b. Enter your date of birth.   c. Enter your home email address.   d. Click  Submit, and follow the next screen’s instructions.  3. Create a 46elkst ID. This will be your [employer] login ID and cannot be changed, so think of one that is secure and easy to remember.  4. Create a 46elkst password. You can change your password at any time.  5. Enter your Password Reset Question and Answer. This can be used at a later time if you forget your password.   6. Enter your e-mail address. This allows you to receive e-mail notifications when new information is available in [employer].  7. Click Sign Up. You can now view your health information.    For assistance activating your [employer] account, call [phone]

## 2017-08-18 NOTE — PROGRESS NOTES
Wound site is healing well-- patient advised to watch for increased redness, swelling, oozing or fever--verbalized understanding.

## 2017-08-21 ENCOUNTER — TELEPHONE (OUTPATIENT)
Dept: CARDIOLOGY | Facility: MEDICAL CENTER | Age: 67
End: 2017-08-21

## 2017-08-21 NOTE — TELEPHONE ENCOUNTER
Message  Received: Today       Kaitlin Kaufman, Med Ass't  Jose M Wagoner M.D.; Angelique Rainey R.N.                   Patient's device transmitted via home monitor-- he had AF on 8/20 lasting >5 hours.     Thanks   Kaitlin

## 2017-08-29 ENCOUNTER — NON-PROVIDER VISIT (OUTPATIENT)
Dept: CARDIOLOGY | Facility: MEDICAL CENTER | Age: 67
End: 2017-08-29
Payer: MEDICARE

## 2017-08-29 ENCOUNTER — OFFICE VISIT (OUTPATIENT)
Dept: CARDIOLOGY | Facility: MEDICAL CENTER | Age: 67
End: 2017-08-29
Payer: MEDICARE

## 2017-08-29 ENCOUNTER — TELEPHONE (OUTPATIENT)
Dept: CARDIOLOGY | Facility: MEDICAL CENTER | Age: 67
End: 2017-08-29

## 2017-08-29 DIAGNOSIS — I48.0 PAROXYSMAL ATRIAL FIBRILLATION (HCC): ICD-10-CM

## 2017-08-29 PROCEDURE — 99214 OFFICE O/P EST MOD 30 MIN: CPT | Mod: 24 | Performed by: INTERNAL MEDICINE

## 2017-08-29 RX ORDER — DIGOXIN 125 MCG
125 TABLET ORAL DAILY
Qty: 31 TAB | Refills: 6 | Status: SHIPPED | OUTPATIENT
Start: 2017-08-29

## 2017-08-29 NOTE — TELEPHONE ENCOUNTER
Pt walked in primarily German speaking only, c/o to Namrata MA was on sotalol 40 BID pre PM, sotalol increased to 80 BID, 20 min after he takes it he feels like his throat is closing and getting tight, gets sweaty, denies chest pain, takes a NTG for the throat closing, and it helps, discussed with SULEMAN, FELICITY with rapid rates, SULEMAN to see pt. Pt stopped ASA 5 days ago on his own.

## 2017-08-30 ENCOUNTER — TELEPHONE (OUTPATIENT)
Dept: CARDIOLOGY | Facility: MEDICAL CENTER | Age: 67
End: 2017-08-30

## 2017-08-30 NOTE — TELEPHONE ENCOUNTER
From: Li West   Sent: 8/30/2017   2:33 PM   To: Angelique Rainey R.N.   Subject: Pt calling report symptoms he had this after*     SULEMAN/Angelique     Pt is calling to report symptoms that started around 1 pm this afternoon. States he experienced lightheadedness, blurred vision, shortness of breath. Also reports he did faint around 2 pm while placing a dish in the sink, per pt lasted a couple seconds. He would please like a call back and can be reached at  [phone].

## 2017-08-30 NOTE — TELEPHONE ENCOUNTER
Patient came into the office on 8/29/17. Stating he has been having sweating and fast heart rate since Sunday 8/27/17. Patient indicated he was taking 1 pill of his Sotalol (80mg) in the AM and in PM. States since they changed this he has been feeling the symptoms he informed. Spoke with Dr. Wagoner and Angelique and informed patient we would add 2 medications to help: Digoxin .125 MG and Eliquis 5mg. Patient was informed to take 2 of the digoxin the first day and then 1 after. Eliquis he was to take 1 when he picked up and then, take 1 am and 1 pm, cut his sotalol 1/2 in am and 1/2 in pm. Patient left and understood all changes to medication, he was also informed to call back with any changes. Informed patient it takes few days for medication to react to his body.   Patient came in to clinic again on 8/30/17, informing that he took 1 Eliquis on 8/29/17 @ 12pm, and was still feeling same symptoms as before, he didn't take any other medication that day or this morning. Informed patient and wife that needs to take the medication Dr. Wagoner changed for him and again it need to take time for medication to react. Patient was informed once again of the changes as yesterday and was informed to cut back on caffeine as this can cause heart rate to increas, he should switch decafe. To help with this. Patient is to call on Friday and informed to call and update on how hes feeling.

## 2017-08-30 NOTE — TELEPHONE ENCOUNTER
Returned patients phone call regarding previous message: Spoke with patient he states he was sitting and when he stood up he felt dizzy and felt he passed out for less then 3 seconds, he was able to catch his balance and did not fall. Patient was explained how to take his medication again, patient wanted to know what time he should be taking his medications,explained to patient to try and take at the same hour each day: If he takes his medication at 8am to take his afternoon pills at 8pm. Needs to be 12hours difference from when he took his first medications in the morning. Patient was informed to make sure to drink enough water a day.   Patient will try to obtain a blood pressure cuff. He will call back on Friday 9/1/17 to inform how he feels after taking his medications.

## 2017-09-01 ENCOUNTER — TELEPHONE (OUTPATIENT)
Dept: CARDIOLOGY | Facility: MEDICAL CENTER | Age: 67
End: 2017-09-01

## 2017-09-01 NOTE — TELEPHONE ENCOUNTER
Sherry Ren R.N.             SULEMAN/Jennifer     This is a Tristanian speaking patient. He spoke to Namrata Watson on Wednesday, 8/30 and was told to call today with an update on how he's doing. He can be reached at [phone] and needs a .      DANNIE Jean-Baptiste spoke with pt who reports he is doing better today, just feeling a little weak.  Continues with BID dosing of meds, drinks a lot of H2O but does not know BP.   Does not know BP because he cannot afford a BP cuff.   I had Jerilyn tell him our office will call next week to schedule follow up with NP due evaluate effectiveness of medication changes.    No APN appts available.  We will schedule with Dr. Wagoner.   Sent message to Brittny, asking her to call on Tuesday and let pt know appt date and time.

## 2017-09-05 ENCOUNTER — TELEPHONE (OUTPATIENT)
Dept: CARDIOLOGY | Facility: MEDICAL CENTER | Age: 67
End: 2017-09-05

## 2017-09-05 NOTE — TELEPHONE ENCOUNTER
To MR to call pt, pt needs to keep appt because he hasnt been feeling well for quite a while and we need to make sure he is back in normal rhythm, BP is good etc.

## 2017-09-05 NOTE — TELEPHONE ENCOUNTER
S/W PT in Yemeni and discussed tomorrow's appointment. PT reports that he is feeling better and was wondering if he should cancel appt. PT was strongly discouraged from canceling it. PT is agreeable with plan and will come to appt tomorrow.  Flory HUTTON RN

## 2017-09-05 NOTE — TELEPHONE ENCOUNTER
----- Message from Liza oMrales sent at 9/5/2017  1:10 PM PDT -----  Regarding: pt wants to cancel appt  Contact: [phone]  SULEMAN/Angelique    Pt calling to state he wants to cancel tomorrow's appt, please call pt to advise, [phone]

## 2017-09-06 ENCOUNTER — OFFICE VISIT (OUTPATIENT)
Dept: CARDIOLOGY | Facility: MEDICAL CENTER | Age: 67
End: 2017-09-06
Payer: MEDICARE

## 2017-09-06 ENCOUNTER — TELEPHONE (OUTPATIENT)
Dept: CARDIOLOGY | Facility: MEDICAL CENTER | Age: 67
End: 2017-09-06

## 2017-09-06 VITALS
HEART RATE: 60 BPM | DIASTOLIC BLOOD PRESSURE: 70 MMHG | WEIGHT: 154 LBS | BODY MASS INDEX: 25.66 KG/M2 | SYSTOLIC BLOOD PRESSURE: 112 MMHG | HEIGHT: 65 IN | OXYGEN SATURATION: 96 %

## 2017-09-06 DIAGNOSIS — I48.0 PAROXYSMAL ATRIAL FIBRILLATION (HCC): ICD-10-CM

## 2017-09-06 LAB — EKG IMPRESSION: NORMAL

## 2017-09-06 PROCEDURE — 99214 OFFICE O/P EST MOD 30 MIN: CPT | Mod: 24,25 | Performed by: INTERNAL MEDICINE

## 2017-09-06 PROCEDURE — 93280 PM DEVICE PROGR EVAL DUAL: CPT | Performed by: INTERNAL MEDICINE

## 2017-09-06 PROCEDURE — 93000 ELECTROCARDIOGRAM COMPLETE: CPT | Mod: 59 | Performed by: INTERNAL MEDICINE

## 2017-09-06 RX ORDER — SOTALOL HYDROCHLORIDE 120 MG/1
120 TABLET ORAL 2 TIMES DAILY
Qty: 180 TAB | Refills: 3 | Status: SHIPPED | OUTPATIENT
Start: 2017-09-06 | End: 2017-09-06

## 2017-09-06 RX ORDER — SOTALOL HYDROCHLORIDE 80 MG/1
80 TABLET ORAL 2 TIMES DAILY
Qty: 180 TAB | Refills: 3 | Status: SHIPPED | OUTPATIENT
Start: 2017-09-06 | End: 2017-09-29 | Stop reason: SDUPTHER

## 2017-09-06 ASSESSMENT — ENCOUNTER SYMPTOMS: DIZZINESS: 1

## 2017-09-06 NOTE — PROGRESS NOTES
"Subjective:   Abel Andersen is a 67 y.o. male who presents today for follow up    Good for the last week while in sinus.  Bad in a fib.  Near syncopal    Rates over 200    Has only been taking 40mg twice a day    Past Medical History:   Diagnosis Date   • Atrial fibrillation (CMS-HCC) 8/28/2012   • CHEST PAIN 8/28/2012   • Palpitations 8/28/2012   • Hyperlipemia 8/28/2012   • Back pain      No past surgical history on file.  History reviewed. No pertinent family history.  History   Smoking Status   • Former Smoker   • Packs/day: 0.50   • Years: 10.00   • Quit date: 8/28/1982   Smokeless Tobacco   • Never Used     Allergies   Allergen Reactions   • Nkda [No Known Drug Allergy]      Outpatient Encounter Prescriptions as of 9/6/2017   Medication Sig Dispense Refill   • apixaban (ELIQUIS) 5mg Tab Take 1 Tab by mouth 2 Times a Day. 60 Tab 0   • digoxin (LANOXIN) 125 MCG Tab Take 1 Tab by mouth every day. 31 Tab 6   • atorvastatin (LIPITOR) 20 MG Tab Take 1 Tab by mouth every day. 30 Tab    • sotalol (BETAPACE) 80 MG Tab Take 1 Tab by mouth 2 times a day. 60 Tab 3   • nitroglycerin (NITROSTAT) 0.4 MG SUBL Place 0.4 mg under tongue every 5 minutes as needed.     • apixaban (ELIQUIS) 5mg Tab Take 1 Tab by mouth 2 Times a Day. 60 Tab 6   • doxazosin (CARDURA) 1 MG Tab      • metoprolol SR (TOPROL XL) 50 MG TABLET SR 24 HR        No facility-administered encounter medications on file as of 9/6/2017.      Review of Systems   Neurological: Positive for dizziness.        Objective:   /70   Pulse 60   Ht 1.651 m (5' 5\")   Wt 69.9 kg (154 lb)   SpO2 96%   BMI 25.63 kg/m²     Physical Exam   Constitutional: He is oriented to person, place, and time. He appears well-developed and well-nourished. No distress.   HENT:   Head: Normocephalic and atraumatic.   Right Ear: External ear normal.   Left Ear: External ear normal.   Nose: Nose normal.   Mouth/Throat: Oropharynx is clear and moist.   Eyes: Conjunctivae and EOM " are normal. Pupils are equal, round, and reactive to light. Right eye exhibits no discharge. Left eye exhibits no discharge. No scleral icterus.   Neck: Normal range of motion. Neck supple. No JVD present. No tracheal deviation present.   Cardiovascular: Normal rate, regular rhythm, normal heart sounds and intact distal pulses.  Exam reveals no gallop and no friction rub.    No murmur heard.  Ppm well healed   Pulmonary/Chest: Effort normal and breath sounds normal. No stridor. No respiratory distress. He has no wheezes. He has no rales. He exhibits no tenderness.   Abdominal: Soft. He exhibits no distension. There is no tenderness.   Musculoskeletal: He exhibits no edema or tenderness.   Neurological: He is alert and oriented to person, place, and time. No cranial nerve deficit. Coordination normal.   Skin: Skin is warm and dry. No rash noted. He is not diaphoretic. No erythema. No pallor.   Psychiatric: He has a normal mood and affect. His behavior is normal. Judgment and thought content normal.   Vitals reviewed.      Assessment:     1. Paroxysmal atrial fibrillation (CMS-[facility])  EKG       Medical Decision Making:  Today's Assessment / Status / Plan:   Increase sotalol to 80 bid  ecg in a week  F/u 3 months.  Will stop digoxin then if no a fib over the 3 months

## 2017-09-06 NOTE — TELEPHONE ENCOUNTER
Pt requested eliquis side effects in Nauruan spoke to georgi at [facility] and also switched his rx labels over to help patient with understanding dosing-am

## 2017-09-06 NOTE — TELEPHONE ENCOUNTER
Educated patient on asking [facility] pharmacy if they possiable can do Rx labels in Chinese to help with assurance on dosing-am

## 2017-09-29 ENCOUNTER — NON-PROVIDER VISIT (OUTPATIENT)
Dept: CARDIOLOGY | Facility: MEDICAL CENTER | Age: 67
End: 2017-09-29
Payer: MEDICARE

## 2017-09-29 ENCOUNTER — OFFICE VISIT (OUTPATIENT)
Dept: CARDIOLOGY | Facility: MEDICAL CENTER | Age: 67
End: 2017-09-29
Payer: MEDICARE

## 2017-09-29 VITALS
OXYGEN SATURATION: 98 % | HEART RATE: 60 BPM | SYSTOLIC BLOOD PRESSURE: 110 MMHG | HEIGHT: 65 IN | BODY MASS INDEX: 25.29 KG/M2 | DIASTOLIC BLOOD PRESSURE: 62 MMHG | WEIGHT: 151.8 LBS

## 2017-09-29 DIAGNOSIS — I48.0 PAF (PAROXYSMAL ATRIAL FIBRILLATION) (HCC): ICD-10-CM

## 2017-09-29 DIAGNOSIS — R00.1 BRADYCARDIA: ICD-10-CM

## 2017-09-29 LAB — EKG IMPRESSION: NORMAL

## 2017-09-29 PROCEDURE — 93280 PM DEVICE PROGR EVAL DUAL: CPT | Performed by: INTERNAL MEDICINE

## 2017-09-29 PROCEDURE — 93000 ELECTROCARDIOGRAM COMPLETE: CPT | Mod: 59 | Performed by: INTERNAL MEDICINE

## 2017-09-29 PROCEDURE — 99214 OFFICE O/P EST MOD 30 MIN: CPT | Mod: 24 | Performed by: INTERNAL MEDICINE

## 2017-09-29 RX ORDER — SOTALOL HYDROCHLORIDE 120 MG/1
120 TABLET ORAL 2 TIMES DAILY
Qty: 180 TAB | Refills: 1 | Status: SHIPPED | OUTPATIENT
Start: 2017-09-29

## 2017-09-29 NOTE — PROGRESS NOTES
"Subjective:   Abel Andersen is a 67 y.o. male who presents today for follow up of a fib and ppm    The other night he was sob when he was going to lay down.  Happens sometimes.  Every couple days    [place] device down to 2% a fib.  Still fast with the afib        Past Medical History:   Diagnosis Date   • Atrial fibrillation (CMS-HCC) 8/28/2012   • Back pain    • CHEST PAIN 8/28/2012   • Hyperlipemia 8/28/2012   • Palpitations 8/28/2012     History reviewed. No pertinent surgical history.  History reviewed. No pertinent family history.  History   Smoking Status   • Former Smoker   • Packs/day: 0.50   • Years: 10.00   • Quit date: 8/28/1982   Smokeless Tobacco   • Never Used     Allergies   Allergen Reactions   • Nkda [No Known Drug Allergy]      Outpatient Encounter Prescriptions as of 9/29/2017   Medication Sig Dispense Refill   • sotalol (BETAPACE) 120 MG tablet Take 1 Tab by mouth 2 times a day. 180 Tab 1   • apixaban (ELIQUIS) 5mg Tab Take 1 Tab by mouth 2 Times a Day. 60 Tab 0   • digoxin (LANOXIN) 125 MCG Tab Take 1 Tab by mouth every day. 31 Tab 6   • atorvastatin (LIPITOR) 20 MG Tab Take 1 Tab by mouth every day. 30 Tab    • [DISCONTINUED] sotalol (BETAPACE) 80 MG Tab Take 1 Tab by mouth 2 times a day. 180 Tab 3   • nitroglycerin (NITROSTAT) 0.4 MG SUBL Place 0.4 mg under tongue every 5 minutes as needed.       No facility-administered encounter medications on file as of 9/29/2017.      Review of Systems   Cardiovascular: Positive for palpitations.   Neurological: Positive for dizziness.        Objective:   /62   Pulse 60   Ht 1.651 m (5' 5\")   Wt 68.9 kg (151 lb 12.8 oz)   SpO2 98%   BMI 25.26 kg/m²     Physical Exam   Constitutional: He is oriented to person, place, and time. He appears well-developed and well-nourished. No distress.   Generally better appearing.  Appears less tired and less aged   HENT:   Head: Normocephalic and atraumatic.   Right Ear: External ear normal.   Left Ear: " External ear normal.   Nose: Nose normal.   Mouth/Throat: Oropharynx is clear and moist.   Eyes: Conjunctivae and EOM are normal. Pupils are equal, round, and reactive to light. Right eye exhibits no discharge. Left eye exhibits no discharge. No scleral icterus.   Neck: Normal range of motion. Neck supple. No JVD present. No tracheal deviation present.   Cardiovascular: Normal rate, regular rhythm, normal heart sounds and intact distal pulses.  Exam reveals no gallop and no friction rub.    No murmur heard.  Device pocket well healed   Pulmonary/Chest: Effort normal and breath sounds normal. No stridor. No respiratory distress. He has no wheezes. He has no rales. He exhibits no tenderness.   Abdominal: Soft. He exhibits no distension. There is no tenderness.   Musculoskeletal: He exhibits no edema or tenderness.   Neurological: He is alert and oriented to person, place, and time. No cranial nerve deficit. Coordination normal.   Skin: Skin is warm and dry. No rash noted. He is not diaphoretic. No erythema. No pallor.   Psychiatric: He has a normal mood and affect. His behavior is normal. Judgment and thought content normal.   Vitals reviewed.      Assessment:     1. PAF (paroxysmal atrial fibrillation) (CMS-[facility])  EKG       Medical Decision Making:  Today's Assessment / Status / Plan:   Increase sotalol to 120 mg bid to try to get better control  ecg next week.

## 2017-10-01 VITALS — DIASTOLIC BLOOD PRESSURE: 65 MMHG | HEART RATE: 125 BPM | SYSTOLIC BLOOD PRESSURE: 105 MMHG

## 2017-10-01 ASSESSMENT — ENCOUNTER SYMPTOMS
LOSS OF CONSCIOUSNESS: 0
PALPITATIONS: 1
SHORTNESS OF BREATH: 1
DIZZINESS: 1

## 2017-10-02 NOTE — PROGRESS NOTES
Subjective:   Abel Andersen is a 67 y.o. male who presents today for follow up of recent ppm and a fib    He is in a fib much of the time and rates uncontrolled.  Stopped meds as not feeling well and thought that may be the cause.    Dizzy and sob.  Not as dizzy as before but still some    Past Medical History:   Diagnosis Date   • Atrial fibrillation (CMS-HCC) 8/28/2012   • CHEST PAIN 8/28/2012   • Palpitations 8/28/2012   • Hyperlipemia 8/28/2012   • Back pain      No past surgical history on file.  No family history on file.  History   Smoking Status   • Former Smoker   • Packs/day: 0.50   • Years: 10.00   • Quit date: 8/28/1982   Smokeless Tobacco   • Never Used     Allergies   Allergen Reactions   • Nkda [No Known Drug Allergy]      Outpatient Encounter Prescriptions as of 8/29/2017   Medication Sig Dispense Refill   • atorvastatin (LIPITOR) 20 MG Tab Take 1 Tab by mouth every day. 30 Tab    • [DISCONTINUED] sotalol (BETAPACE) 80 MG Tab Take 1 Tab by mouth 2 times a day. 60 Tab 3   • [DISCONTINUED] doxazosin (CARDURA) 1 MG Tab      • [DISCONTINUED] metoprolol SR (TOPROL XL) 50 MG TABLET SR 24 HR      • [DISCONTINUED] aspirin 81 MG tablet Take 81 mg by mouth every day.     • nitroglycerin (NITROSTAT) 0.4 MG SUBL Place 0.4 mg under tongue every 5 minutes as needed.       No facility-administered encounter medications on file as of 8/29/2017.      Review of Systems   Constitutional: Positive for malaise/fatigue.   Respiratory: Positive for shortness of breath.    Cardiovascular: Positive for palpitations.   Neurological: Positive for dizziness. Negative for loss of consciousness.        Objective:   There were no vitals taken for this visit.  bp 105/65   Hr 120's  Physical Exam   Constitutional: He is oriented to person, place, and time. He appears well-developed and well-nourished. No distress.   HENT:   Head: Normocephalic and atraumatic.   Right Ear: External ear normal.   Left Ear: External ear normal.    Nose: Nose normal.   Mouth/Throat: Oropharynx is clear and moist.   Eyes: Conjunctivae and EOM are normal. Pupils are equal, round, and reactive to light. Right eye exhibits no discharge. Left eye exhibits no discharge. No scleral icterus.   Neck: Normal range of motion. Neck supple. No JVD present. No tracheal deviation present.   Cardiovascular: Normal heart sounds and intact distal pulses.  An irregularly irregular rhythm present. Tachycardia present.  Exam reveals no gallop and no friction rub.    No murmur heard.  Device pocket well healed   Pulmonary/Chest: Effort normal and breath sounds normal. No stridor. No respiratory distress. He has no wheezes. He has no rales. He exhibits no tenderness.   Abdominal: Soft. He exhibits no distension. There is no tenderness.   Musculoskeletal: He exhibits no edema or tenderness.   Neurological: He is alert and oriented to person, place, and time. No cranial nerve deficit. Coordination normal.   Skin: Skin is warm and dry. No rash noted. He is not diaphoretic. No erythema. No pallor.   Psychiatric: He has a normal mood and affect. His behavior is normal. Judgment and thought content normal.   Vitals reviewed.      Assessment:     1. Paroxysmal atrial fibrillation (CMS-HCC)         Medical Decision Making:  Today's Assessment / Status / Plan:     Continue oac  Digoxin for better rate control  Back on sotalol regularly as this all appears to be symptoms of the rapid a fib.

## 2017-10-05 ENCOUNTER — NON-PROVIDER VISIT (OUTPATIENT)
Dept: CARDIOLOGY | Facility: MEDICAL CENTER | Age: 67
End: 2017-10-05
Payer: MEDICARE

## 2017-10-05 DIAGNOSIS — I48.91 ATRIAL FIBRILLATION, UNSPECIFIED TYPE (HCC): ICD-10-CM

## 2017-10-05 PROCEDURE — 93000 ELECTROCARDIOGRAM COMPLETE: CPT | Performed by: INTERNAL MEDICINE

## 2017-10-06 LAB — EKG IMPRESSION: NORMAL

## 2017-10-20 ENCOUNTER — TELEPHONE (OUTPATIENT)
Dept: CARDIOLOGY | Facility: MEDICAL CENTER | Age: 67
End: 2017-10-20

## 2017-10-20 ASSESSMENT — ENCOUNTER SYMPTOMS
PALPITATIONS: 1
DIZZINESS: 1

## 2017-10-20 NOTE — TELEPHONE ENCOUNTER
Flory Knowles R.N.  Jennifer Ren R.N.             Called Pt several times, I also called his emergency #. Left VM in both numbers to call back.   Flory

## 2017-10-20 NOTE — TELEPHONE ENCOUNTER
----- Message from Valerie West, Med Ass't sent at 10/20/2017  9:10 AM PDT -----  Regarding: Medication Symptoms  Contact: [phone]  Patient of JE would like a call back regarding apixaban (ELIQUIS) 5mg Tab he is taking and now he is getting a rash in legs and lungs feel tightness. Indonesian SPEAKING.    Thank you

## 2017-10-23 NOTE — TELEPHONE ENCOUNTER
S/W Pt he reports that his rash is better he thinks it's r/t his soap b/c he stopped it and now the rash is better. Pt denies any acute issues at this moment, no CP or discomfort. He has been taking his medications as prescribed and will continue. Pt was encouraged to call us in new question emerge. Pt tankful for call.  Flory HUTTON RN

## 2017-11-07 ENCOUNTER — OFFICE VISIT (OUTPATIENT)
Dept: CARDIOLOGY | Facility: MEDICAL CENTER | Age: 67
End: 2017-11-07
Payer: MEDICARE

## 2017-11-07 VITALS
HEIGHT: 65 IN | HEART RATE: 90 BPM | SYSTOLIC BLOOD PRESSURE: 120 MMHG | OXYGEN SATURATION: 97 % | BODY MASS INDEX: 22.82 KG/M2 | WEIGHT: 137 LBS | DIASTOLIC BLOOD PRESSURE: 70 MMHG

## 2017-11-07 DIAGNOSIS — I48.0 PAF (PAROXYSMAL ATRIAL FIBRILLATION) (HCC): ICD-10-CM

## 2017-11-07 LAB — EKG IMPRESSION: NORMAL

## 2017-11-07 PROCEDURE — 93280 PM DEVICE PROGR EVAL DUAL: CPT | Performed by: INTERNAL MEDICINE

## 2017-11-07 PROCEDURE — 99214 OFFICE O/P EST MOD 30 MIN: CPT | Mod: 24,25 | Performed by: INTERNAL MEDICINE

## 2017-11-07 PROCEDURE — 93000 ELECTROCARDIOGRAM COMPLETE: CPT | Mod: 59 | Performed by: INTERNAL MEDICINE

## 2017-11-08 NOTE — PROGRESS NOTES
"Subjective:   Abel Andersen is a 67 y.o. male who presents today for follow up of a fib and ppm    Had  a rash on leg but otherwise good.  Gone now.  Not sure what it was    Not feeling any a fib.    [place] ppm still with 2% af and some rates to 160's during the short episode but generally appear to have v rates under 120 with the af      Past Medical History:   Diagnosis Date   • Atrial fibrillation (CMS-HCC) 8/28/2012   • Back pain    • CHEST PAIN 8/28/2012   • Hyperlipemia 8/28/2012   • Palpitations 8/28/2012     History reviewed. No pertinent surgical history.  History reviewed. No pertinent family history.  History   Smoking Status   • Former Smoker   • Packs/day: 0.50   • Years: 10.00   • Quit date: 8/28/1982   Smokeless Tobacco   • Never Used     Allergies   Allergen Reactions   • Nkda [No Known Drug Allergy]      Outpatient Encounter Prescriptions as of 11/7/2017   Medication Sig Dispense Refill   • sotalol (BETAPACE) 120 MG tablet Take 1 Tab by mouth 2 times a day. 180 Tab 1   • apixaban (ELIQUIS) 5mg Tab Take 1 Tab by mouth 2 Times a Day. 60 Tab 0   • digoxin (LANOXIN) 125 MCG Tab Take 1 Tab by mouth every day. 31 Tab 6   • atorvastatin (LIPITOR) 20 MG Tab Take 1 Tab by mouth every day. 30 Tab    • nitroglycerin (NITROSTAT) 0.4 MG SUBL Place 0.4 mg under tongue every 5 minutes as needed.       No facility-administered encounter medications on file as of 11/7/2017.      Review of Systems   Constitutional: Negative for malaise/fatigue.   Cardiovascular: Negative for palpitations.   Neurological: Negative for dizziness and loss of consciousness.        Objective:   /70   Pulse 90   Ht 1.651 m (5' 5\")   Wt 62.1 kg (137 lb)   SpO2 97%   BMI 22.80 kg/m²     Physical Exam   Constitutional: He is oriented to person, place, and time. He appears well-developed and well-nourished. No distress.   HENT:   Head: Normocephalic and atraumatic.   Right Ear: External ear normal.   Left Ear: External ear " normal.   Nose: Nose normal.   Mouth/Throat: Oropharynx is clear and moist.   Eyes: Conjunctivae and EOM are normal. Pupils are equal, round, and reactive to light. Right eye exhibits no discharge. Left eye exhibits no discharge. No scleral icterus.   Neck: Normal range of motion. Neck supple. No JVD present. No tracheal deviation present.   Cardiovascular: Normal rate, regular rhythm, normal heart sounds and intact distal pulses.  Exam reveals no gallop and no friction rub.    No murmur heard.  Ppm site well healed   Pulmonary/Chest: Effort normal and breath sounds normal. No stridor. No respiratory distress. He has no wheezes. He has no rales. He exhibits no tenderness.   Abdominal: Soft. He exhibits no distension. There is no tenderness.   Musculoskeletal: He exhibits no edema or tenderness.   Neurological: He is alert and oriented to person, place, and time. No cranial nerve deficit. Coordination normal.   Skin: Skin is warm and dry. No rash noted. He is not diaphoretic. No erythema. No pallor.   Psychiatric: He has a normal mood and affect. His behavior is normal. Judgment and thought content normal.   Vitals reviewed.      Assessment:     1. PAF (paroxysmal atrial fibrillation) (CMS-[facility])  EKG     ecg  with qtc 412  In sinus  Medical Decision Making:  Today's Assessment / Status / Plan:   Doing well on sotalol  Due to cost concerns may change to asa from oac- reasonable for chadsvasc 1    I worry that with still having a fib may be increasing in future but he is satisfied with control on current sotalol for now

## 2017-11-13 ENCOUNTER — TELEPHONE (OUTPATIENT)
Dept: CARDIOLOGY | Facility: MEDICAL CENTER | Age: 67
End: 2017-11-13

## 2017-11-13 DIAGNOSIS — I48.0 PAROXYSMAL ATRIAL FIBRILLATION (HCC): ICD-10-CM

## 2017-11-13 RX ORDER — ATORVASTATIN CALCIUM 20 MG/1
20 TABLET, FILM COATED ORAL EVERY EVENING
Qty: 30 TAB | Refills: 11 | Status: SHIPPED | OUTPATIENT
Start: 2017-11-13

## 2017-11-13 NOTE — TELEPHONE ENCOUNTER
Spoke to patient states he needs refill of Lipitor sent to [facility] 30day with refills. Also states he will start 325mg asa and stop eliquis due to cost ok per [facility]

## 2017-11-19 ASSESSMENT — ENCOUNTER SYMPTOMS
LOSS OF CONSCIOUSNESS: 0
PALPITATIONS: 0
DIZZINESS: 0

## 2017-11-22 ENCOUNTER — HOSPITAL ENCOUNTER (EMERGENCY)
Facility: MEDICAL CENTER | Age: 67
End: 2017-11-23
Attending: EMERGENCY MEDICINE
Payer: MEDICARE

## 2017-11-22 VITALS
RESPIRATION RATE: 16 BRPM | OXYGEN SATURATION: 97 % | DIASTOLIC BLOOD PRESSURE: 72 MMHG | HEART RATE: 67 BPM | SYSTOLIC BLOOD PRESSURE: 140 MMHG | TEMPERATURE: 99.7 F | WEIGHT: 153.66 LBS | BODY MASS INDEX: 25.57 KG/M2

## 2017-11-22 DIAGNOSIS — R30.0 DYSURIA: ICD-10-CM

## 2017-11-22 LAB
ANION GAP SERPL CALC-SCNC: 7 MMOL/L (ref 0–11.9)
APPEARANCE UR: CLEAR
BACTERIA #/AREA URNS HPF: NEGATIVE /HPF
BASOPHILS # BLD AUTO: 0.2 % (ref 0–1.8)
BASOPHILS # BLD: 0.01 K/UL (ref 0–0.12)
BILIRUB UR QL STRIP.AUTO: NEGATIVE
BUN SERPL-MCNC: 12 MG/DL (ref 8–22)
CALCIUM SERPL-MCNC: 9.3 MG/DL (ref 8.5–10.5)
CHLORIDE SERPL-SCNC: 99 MMOL/L (ref 96–112)
CO2 SERPL-SCNC: 24 MMOL/L (ref 20–33)
COLOR UR: YELLOW
CREAT SERPL-MCNC: 0.83 MG/DL (ref 0.5–1.4)
CULTURE IF INDICATED INDCX: YES UA CULTURE
EOSINOPHIL # BLD AUTO: 0 K/UL (ref 0–0.51)
EOSINOPHIL NFR BLD: 0 % (ref 0–6.9)
EPI CELLS #/AREA URNS HPF: ABNORMAL /HPF
ERYTHROCYTE [DISTWIDTH] IN BLOOD BY AUTOMATED COUNT: 43.9 FL (ref 35.9–50)
GFR SERPL CREATININE-BSD FRML MDRD: >60 ML/MIN/1.73 M 2
GLUCOSE SERPL-MCNC: 120 MG/DL (ref 65–99)
GLUCOSE UR STRIP.AUTO-MCNC: NEGATIVE MG/DL
HCT VFR BLD AUTO: 38.5 % (ref 42–52)
HGB BLD-MCNC: 13.3 G/DL (ref 14–18)
HYALINE CASTS #/AREA URNS LPF: ABNORMAL /LPF
IMM GRANULOCYTES # BLD AUTO: 0.03 K/UL (ref 0–0.11)
IMM GRANULOCYTES NFR BLD AUTO: 0.5 % (ref 0–0.9)
KETONES UR STRIP.AUTO-MCNC: NEGATIVE MG/DL
LEUKOCYTE ESTERASE UR QL STRIP.AUTO: ABNORMAL
LYMPHOCYTES # BLD AUTO: 0.98 K/UL (ref 1–4.8)
LYMPHOCYTES NFR BLD: 15 % (ref 22–41)
MCH RBC QN AUTO: 31 PG (ref 27–33)
MCHC RBC AUTO-ENTMCNC: 34.5 G/DL (ref 33.7–35.3)
MCV RBC AUTO: 89.7 FL (ref 81.4–97.8)
MICRO URNS: ABNORMAL
MONOCYTES # BLD AUTO: 0.55 K/UL (ref 0–0.85)
MONOCYTES NFR BLD AUTO: 8.4 % (ref 0–13.4)
NEUTROPHILS # BLD AUTO: 4.98 K/UL (ref 1.82–7.42)
NEUTROPHILS NFR BLD: 75.9 % (ref 44–72)
NITRITE UR QL STRIP.AUTO: NEGATIVE
NRBC # BLD AUTO: 0 K/UL
NRBC BLD AUTO-RTO: 0 /100 WBC
PH UR STRIP.AUTO: 5.5 [PH]
PLATELET # BLD AUTO: 144 K/UL (ref 164–446)
PMV BLD AUTO: 10.3 FL (ref 9–12.9)
POTASSIUM SERPL-SCNC: 3.6 MMOL/L (ref 3.6–5.5)
PROT UR QL STRIP: NEGATIVE MG/DL
RBC # BLD AUTO: 4.29 M/UL (ref 4.7–6.1)
RBC # URNS HPF: ABNORMAL /HPF
RBC UR QL AUTO: NEGATIVE
SODIUM SERPL-SCNC: 130 MMOL/L (ref 135–145)
SP GR UR STRIP.AUTO: 1.01
UROBILINOGEN UR STRIP.AUTO-MCNC: 1 MG/DL
WBC # BLD AUTO: 6.6 K/UL (ref 4.8–10.8)
WBC #/AREA URNS HPF: ABNORMAL /HPF

## 2017-11-22 PROCEDURE — 87086 URINE CULTURE/COLONY COUNT: CPT

## 2017-11-22 PROCEDURE — 80048 BASIC METABOLIC PNL TOTAL CA: CPT

## 2017-11-22 PROCEDURE — 85025 COMPLETE CBC W/AUTO DIFF WBC: CPT

## 2017-11-22 PROCEDURE — 81001 URINALYSIS AUTO W/SCOPE: CPT

## 2017-11-22 PROCEDURE — 99284 EMERGENCY DEPT VISIT MOD MDM: CPT

## 2017-11-22 PROCEDURE — 80162 ASSAY OF DIGOXIN TOTAL: CPT

## 2017-11-22 ASSESSMENT — PAIN SCALES - GENERAL: PAINLEVEL_OUTOF10: 9

## 2017-11-23 LAB — DIGOXIN SERPL-MCNC: 0.4 NG/ML (ref 0.8–2)

## 2017-11-23 PROCEDURE — 700102 HCHG RX REV CODE 250 W/ 637 OVERRIDE(OP): Performed by: EMERGENCY MEDICINE

## 2017-11-23 PROCEDURE — A9270 NON-COVERED ITEM OR SERVICE: HCPCS | Performed by: EMERGENCY MEDICINE

## 2017-11-23 RX ORDER — CEPHALEXIN 500 MG/1
500 CAPSULE ORAL 3 TIMES DAILY
Qty: 21 CAP | Refills: 0 | Status: SHIPPED | OUTPATIENT
Start: 2017-11-23 | End: 2017-11-30

## 2017-11-23 RX ORDER — CEPHALEXIN 500 MG/1
500 CAPSULE ORAL ONCE
Status: COMPLETED | OUTPATIENT
Start: 2017-11-23 | End: 2017-11-23

## 2017-11-23 RX ADMIN — CEPHALEXIN 500 MG: 500 CAPSULE ORAL at 00:54

## 2017-11-23 NOTE — ED NOTES
Patient to T1 with RN, steady on feet.  Chart up for ERP.  Family at bedside for translation.  Patient Setswana Speaking only.

## 2017-11-23 NOTE — DISCHARGE INSTRUCTIONS
Disuria  (Dysuria)  La disuria es dolor o molestia al orinar. El dolor o la molestia se pueden sentir en el conducto que transporta la orina fuera de la vejiga (uretra) o en el tejido que rodea los genitales. El dolor también se puede sentir en la sakina de la kaylee y en la parte inferior del abdomen y de la espalda. Quizás tenga que orinar con frecuencia o la sensación repentina de tener que orinar (tenesmo vesical). La disuria puede afectar tanto a hombres corey a mujeres, lis es más común en las mujeres.  La causa puede deberse a muchos problemas diferentes:  · Infección en las vías urinarias en mujeres.  · Infección en los riñones o la vejiga.  · Cálculos en los riñones o la vejiga.  · Ciertas enfermedades de transmisión sexual (ETS), corey la clamidia.  · Deshidratación.  · Inflamación de la vagina.  · Uso de ciertos medicamentos.  · Uso de ciertos jabones o productos perfumados que provocan irritación.  INSTRUCCIONES PARA EL CUIDADO EN EL HOGAR  Controle sheriff disuria para graciela si hay cambios. Las siguientes indicaciones pueden ayudar a aliviar cualquier molestia que pueda sentir:  · Fabienne suficiente líquido para mantener la orina oscar o de color amarillo pálido.  · Vacíe la vejiga con frecuencia. Evite retener la orina wallace largos períodos.  · Después de defecar, las mujeres deben limpiarse desde adelante hacia atrás, usando el papel higiénico solo reta vez.  · Vacíe la vejiga después de tener relaciones sexuales.  · [place] los medicamentos solamente corey se lo haya indicado el médico.  · Si le recetaron antibióticos, asegúrese de terminarlos, incluso si comienza a sentirse mejor.  · Evite la cafeína, el té y el alcohol. Estos productos pueden irritar la vejiga y empeorar la disuria. En los hombres, el alcohol puede irritar la próstata.  · Concurra a todas las visitas de control corey se lo haya indicado el médico. Alta es importante.  · Si le realizaron pruebas para detectar la causa de la disuria, es sheriff  responsabilidad retirar los resultados. Consulte en el laboratorio o en el departamento en el que fue realizado el estudio cuándo y cómo podrá obtener los resultados. Hable con el médico si tiene alguna pregunta sobre los resultados.  SOLICITE ATENCIÓN MÉDICA SI:  · Siente dolor en la espalda o a los costados del cuerpo.  · Tiene fiebre.  · Tiene náuseas o vómitos.  · Observa marco a en la orina.  · Está orinando con más frecuencia que lo habitual.  SOLICITE ATENCIÓN MÉDICA DE INMEDIATO SI:  · El dolor es intenso y no se carl con los medicamentos.  · No puede retener líquido.  · Usted u otra persona advierten algún cambio en sheriff función mental.  · Tiene reta frecuencia cardíaca acelerada en reposo.  · Tiene temblores o escalofríos.  · Se siente muy débil.     Esta información no tiene corey fin reemplazar el consejo del médico. Asegúrese de hacerle al médico cualquier pregunta que tenga.     Document Released: 01/06/2009 Document Revised: 01/08/2016  Elsevier Interactive Patient Education ©2016 [employer]    Please follow-up with your primary care provider for blood pressure management.

## 2017-11-23 NOTE — ED NOTES
[facility]  Chief Complaint   Patient presents with   • Painful Urination     x 4 days,  was seen yesterday at PCP and given IM abx,  states still not feeling well today.    • UTI   • Weakness     generalized     Pt ambulatory to triage with above complaint.  VSS.   [facility] educated on [facility] process, and to inform staff of any changes or concerns.   Specimen cup given and instructed on clean catch urine sample.

## 2017-11-24 LAB
BACTERIA UR CULT: NORMAL
SIGNIFICANT IND 70042: NORMAL
SITE SITE: NORMAL
SOURCE SOURCE: NORMAL

## 2018-01-04 ENCOUNTER — TELEPHONE (OUTPATIENT)
Dept: CARDIOLOGY | Facility: MEDICAL CENTER | Age: 68
End: 2018-01-04

## 2018-01-04 NOTE — TELEPHONE ENCOUNTER
Patient's device transmitted via home monitor-- patient in AF at time of transmission.  Mode switch @ 2% with total time 1.5 days--some episodes with RVR.

## 2019-03-29 NOTE — DISCHARGE INSTRUCTIONS
Discharge Instructions    Discharged to home by car with relative. Discharged via wheelchair, hospital escort: Yes.  Special equipment needed: Not Applicable    Be sure to schedule a follow-up appointment with your primary care doctor or any specialists as instructed.     Discharge Plan:   Influenza Vaccine Indication: Patient Refuses    I understand that a diet low in cholesterol, fat, and sodium is recommended for good health. Unless I have been given specific instructions below for another diet, I accept this instruction as my diet prescription.   Other diet: Cardiac    Special Instructions: No lifting over 5 # with the left arm. No above the head activities with the left arm. Immobilizer at night. Follow-up in [facility] in 1 week.   Report any swelling or redness at the site.     · Is patient discharged on Warfarin / Coumadin?   No     · Is patient Post Blood Transfusion?  No    Depression / Suicide Risk    As you are discharged from this [facility] facility, it is important to learn how to keep safe from harming yourself.    Recognize the warning signs:  · Abrupt changes in personality, positive or negative- including increase in energy   · Giving away possessions  · Change in eating patterns- significant weight changes-  positive or negative  · Change in sleeping patterns- unable to sleep or sleeping all the time   · Unwillingness or inability to communicate  · Depression  · Unusual sadness, discouragement and loneliness  · Talk of wanting to die  · Neglect of personal appearance   · Rebelliousness- reckless behavior  · Withdrawal from people/activities they love  · Confusion- inability to concentrate     If you or a loved one observes any of these behaviors or has concerns about self-harm, here's what you can do:  · Talk about it- your feelings and reasons for harming yourself  · Remove any means that you might use to hurt yourself (examples: pills, rope, extension cords, firearm)  · Get professional  help from the community (Mental Health, Substance Abuse, psychological counseling)  · Do not be alone:Call your Safe Contact- someone whom you trust who will be there for you.  · Call your local CRISIS HOTLINE 556-6352 or 741-129-5879  · Call your local Children's Mobile Crisis Response Team [place] (071) 547-4851 or www.[facility].We Cluster  · Call the toll free National Suicide Prevention Hotlines   · National Suicide Prevention Lifeline 309-817-VERX (7019)  · National Hope Line Network 800-SUICIDE (318-3056)    Implante de un marcapasos, cuidados posteriores  (Pacemaker Implantation, Care After)  Siga estas instrucciones wallace las próximas semanas. Estas indicaciones le proporcionan información general acerca de cómo deberá cuidarse después del procedimiento. El médico también podrá darle instrucciones más específicas. El tratamiento palumbo sido planificado según las prácticas médicas actuales, lis en algunos casos pueden ocurrir problemas. Comuníquese con el médico si tiene algún problema o alguna pregunta sobre el marcapasos.   QUÉ ESPERAR DESPUÉS DEL PROCEDIMIENTO  · Es posible que sienta dolor. Es normal sentir un poco de dolor. Puede durar unos pocos días.  · Quizá note un leve bulto en la piel donde se colocó el dispositivo. A veces, es posible sentir el dispositivo debajo de la piel. [place] es normal.  · Sheriff médico controlará el dispositivo, los conductores y la batería periódicamente wallace los meses y años posteriores. Con el tiempo, cuando la batería esté baja, el dispositivo se reemplazará.  INSTRUCCIONES PARA EL CUIDADO EN EL HOGAR  Medicamentos:  · [place] los medicamentos solamente corey se lo haya indicado el médico.  · Si le recetaron antibióticos, asegúrese de terminarlos, incluso si comienza a sentirse mejor.  · No tome ningún otro medicamento sin consultar antes a sheriff médico. Algunos medicamentos, corey ciertos analgésicos, pueden causar sangrado en el estómago después de la cirugía.  Cuidados de la  herida  · No se quite la venda del pecho hasta que se lo indique el médico.  · Después de quitarse la venda, podrá notar que tiene trozos de cinta en la piel denominados tiras adhesivas sobre la sakina donde se realizó el paulette (lugar de la incisión). Deje que se caigan por sí solas.  · Revise el lugar de la incisión todos los melissa para asegurarse de que no esté infectado, sangrando, o comience a separarse.  · No use lociones o ungüentos cerca del sitio de la incisión, excepto si se lo indican.  · Mantenga limpio y seco el lugar de la incisión wallace 2 a 3 días después del procedimiento, o según lo que le indique el médico. La incisión puede demorar algunas semanas para que se cure completamente.  · No tome todd de inmersión, no nade ni utilice el jacuzzi hasta que el médico lo autorice.  Actividades  · Trate de caminar un poco todos los días. El ejercicio es importante después de jane procedimiento. También es importante usar el hombro del lado del marcapasos en las tareas diarias que no requieran un movimiento exagerado.  · Evite los movimientos bruscos, corey jalar o cortar, que lo adan separar el brazo del cuerpo, wallace al menos 6 semanas.  · No levante la parte superior del brazo por encima de los hombros wallace al menos 6 semanas. [place] significa que no debe jugar al tenis, al golf ni practicar natación wallace jane tiempo. Si duerme con el brazo por arriba de sheriff aleja, use reta restricción para evitar que esto suceda mientras duerme.  · Puede volver a trabajar cuando sheriff médico lo autorice. Consulte a sheriff médico antes de empezar a conducir o a hacer deporte.  Otras indicaciones  · Siga las instrucciones de la dieta si se las proporcionan. Debe ser capaz de comer lo que habitualmente come de inmediato, lis puede que tenga que limitar el consumo de sal.  · Controle sheriff peso a diario. Si aumenta el peso de manera súbita, puede ser que esté acumulando líquido en el organismo.  · Siempre lleve con usted la tarjeta  de identificación del marcapasos. En la tarjeta de identificación deben consignarse la fecha del implante, el modelo del dispositivo y el fabricante. Considere usar reta pulsera o un collar de alerta médica.  · Informe a todos los médicos que tiene un marcapasos. [place] se hace para evitar que le adan estudios de imágenes por resonancia magnética (MRI), debido a que se utilizan potentes imanes wallace la prueba.  · Si tiene que pasar por un detector de metales, atraviéselo rápidamente. No se detenga debajo del detector ni permanezca cerca de él.  · Evite los lugares u objetos donde existe un dian [place] eléctrico o magnético, incluyendo:  ¨ Las lachelle de seguridad de los aeropuertos. Cuando esté en el aeropuerto, informe a los oficiales que tiene un marcapasos. [place]aria tarjeta de identificación le permitirá registrarse de manera wright y sin dañar el marcapasos. Además, no permita que el personal de seguridad pase la vara magnética cerca del marcapasos. Eso puede hacer que deje de funcionar.  ¨ Plantas de energía.  ¨ Generadores eléctricos grandes.  ¨ Brower de transmisión de radiofrecuencia, corey brower de teléfonos celulares y radio.  · No utilice equipos de radio amateur (radioaficionado) o antorchas de soldadura eléctrica (arco). Es seguro usar algunos dispositivos si se mantienen a, por lo menos, 1 pie (30 cm) de distancia del marcapasos. Estos incluyen herramientas eléctricas, cortadoras de césped y altavoces. Si benji que algo puede no ser seguro, consulte a faria médico.  · Puede usar de forma wright mantas eléctricas, cojines de calefacción, computadoras y hornos de microondas.  · Cuando utilice el teléfono celular, sosténgalo en el oído contrario a donde tiene colocado el marcapasos. No guarde el teléfono celular en un bolsillo sobre el marcapasos.  · Concurra a todas las visitas de control corey se lo haya indicado el médico. Esta es la forma en la que faria médico se asegura de que el tórax esté cicatrizando  correctamente. Pregúntele a sheriff médico cuándo debe volver para que le saquen los puntos de sutura o las grapas.  · Mariajose controlar el marcapasos cada 3 a 6 meses o según las indicaciones del médico. La mayoría de los marcapasos ramos entre 4 y 8 años antes de que necesite olivier nuevo.  SOLICITE ATENCIÓN MÉDICA SI:  · Aumenta de peso de forma repentina.  · Las piernas o los pies se le hinchan más de lo habitual.  · Siente corey si el corazón aleteara o que los latidos son intermitentes (palpitaciones).  · Tiene fiebre.  SOLICITE ATENCIÓN MÉDICA DE INMEDIATO SI:  · Siente dolor en el pecho.  · Le shoaib respirar más que antes.  · Tiene más sensación de desvanecimiento que antes.  · Tiene problemas en el lugar de la incisión, corey hinchazón o sangrado, o la incisión comienza a abrirse.  · Hay secreción, enrojecimiento, hinchazón o dolor en el lugar de la incisión.     Esta información no tiene corey fin reemplazar el consejo del médico. Asegúrese de hacerle al médico cualquier pregunta que tenga.     Document Released: 05/06/2010 Document Revised: 01/08/2016  [employer] Interactive Patient Education ©2016 [employer]    Implantación de un marcapasos  (Pacemaker Implantation)  El corazón tiene sheriff propio sistema eléctrico o marcapasos natural para regular los latidos cardíacos. A veces, therese marcapasos natural blayne y hace que el ritmo del corazón sea muy lento. Si esto sucede, podrán implantarle un marcapasos quirúrgicamente para que los latidos del corazón tengan un ritmo normal o programado. Un marcapasos es un dispositivo pequeño, alimentado por reta batería, que se coloca debajo de la piel y se programa para sentir abigail latidos cardíacos. Si la frecuencia cardíaca es inferior a la tasa programada, el marcapasos marcará el ritmo de los latidos del corazón. Las partes del marcapasos son las siguientes:  · Cables o electrodos. Los electrodos se colocan en el corazón y transmiten energía eléctrica. Los electrodos están  conectados al generador de impulsos.  · Generador de impulsos. El generador de impulsos contiene reta computadora y un sistema de memoria. Produce la señal eléctrica que provoca el latido cardíaco.  Se le colocará un marcapasos si:  · Sheriff ritmo cardíaco es muy lento (bradicardia).  · Se desmaya (síncope).  · Le falta el aire (disnea) debido a los problemas cardíacos.  INFORME A SHERIFF MÉDICO:  · Cualquier alergia que tenga.  · Todos los medicamentos que utiliza, incluidos vitaminas, hierbas, gotas oftálmicas, cremas y medicamentos de venta luci.  · Problemas previos que usted o los miembros de sheriff thelma hayan tenido con el uso de anestésicos.  · Enfermedades de la marco a que tenga.  · Cirugías previas.  · Enfermedades que tenga.  · Posibilidad de embarazo, si corresponde.  RIESGOS Y COMPLICACIONES  Generalmente, el implante de un marcapasos es un procedimiento seguro. Sin embargo, pueden ocurrir algunos problemas, por ejemplo:  · Hemorragia.  · No se puede colocar el marcapasos bajo sedación local.  · Infección.  ANTES DEL PROCEDIMIENTO  · Le harán un análisis de marco a antes del procedimiento.  · No consuma ningún producto que contenga tabaco, corey cigarrillos, tabaco de mascar o cigarrillos electrónicos. Si necesita ayuda para dejar de consumir tabaco, hable con el médico.  · No coma ni lenore nada después de la medianoche anterior al procedimiento o según le haya indicado sheriff médico.  · Consulte a sheriff médico acerca de estos temas:  ¨ Cambio o interrupción de los medicamentos que cornelius habitualmente. [place] es muy importante si cornelius medicamentos para la diabetes o anticoagulantes.  ¨ Medicamentos corey aspirina e ibuprofeno. Estos medicamentos pueden diluir la marco a. No tome estos medicamentos antes del procedimiento si se lo indica el médico.  · Consulte al médico si en la mañana del procedimiento puede mauricio los medicamentos aprobados con un sorbo de agua.  PROCEDIMIENTO   Se considera que la cirugía de colocación de un  marcapasos es un procedimiento quirúrgico mínimamente invasivo. Se realiza con un anestésico local, que es reta inyección en el lugar de la incisión que adormece la piel. También le administrarán sedantes y analgésicos que le producirán sueño wallaec el procedimiento.   · Le colocarán reta vía intravenosa (IV) en la mano o el brazo para administrarle los sedantes y analgésicos wallace el procedimiento de colocación del marcapasos.  · Le inyectarán un anestésico en la piel donde se colocará el marcapasos. Luego, le harán reta pequeña incisión en la piel. El marcapasos se coloca debajo de la piel, cerca de la clavícula.  · Reta vez hecha la incisión, los electrodos se insertarán en reta vena giovanna y se guiarán hasta el corazón mediante reta radiografía.  · A través de la misma incisión que se usó para colocar los electrodos, se creará un pequeño bolsillo debajo de la piel para sostener el generador de impulsos. Los electrodos luego se conectarán al generador de impulsos.  · Luego, se cerrará el lugar de la incisión. Se colocará un vendaje (apósito) sobre el lugar del marcapasos. [place] vendaje se quitará después de 24 a 48 horas.  DESPUÉS DEL PROCEDIMIENTO  · Después de implantar el marcapasos lo llevarán a un área de recuperación. Controlarán abigail signos vitales, corey la presión arterial, la frecuencia cardíaca, la respiración y los niveles de oxígeno.  · Le tomarán reta radiografía de tórax después de haberle implantado el marcapasos. [place] es realiza para garantizar que el marcapasos y los electrodos estén en el lugar correcto.     Esta información no tiene corey fin reemplazar el consejo del médico. Asegúrese de hacerle al médico cualquier pregunta que tenga.     Document Released: 09/27/2006 Document Revised: 01/08/2016  [employer]vier Interactive Patient Education ©2016 [employer].     depression

## 2021-01-15 DIAGNOSIS — Z23 NEED FOR VACCINATION: ICD-10-CM

## 2022-04-06 ENCOUNTER — HOSPITAL ENCOUNTER (OUTPATIENT)
Facility: MEDICAL CENTER | Age: 72
End: 2022-04-06
Attending: PHYSICIAN ASSISTANT
Payer: MEDICARE

## 2022-04-07 LAB
FORWARD REASON: SPWHY: NORMAL
FORWARDED TO LAB: SPWHR: NORMAL
SPECIMEN SENT (2ND): SPWT2: NORMAL
SPECIMEN SENT (3RD): SPWT3: NORMAL
SPECIMEN SENT (4TH): SPWT4: NORMAL
SPECIMEN SENT: SPWT1: NORMAL

## 2023-04-08 NOTE — ED PROVIDER NOTES
ER PROVIDER NOTE    Scribed for Akash Gu M.D. by Jacque Kwong. 11/22/2017  10:56 PM    Means of arrival: Walk-in  History obtained from: Patient (Citizen of [place] speaking)  History limited by: None    CHIEF COMPLAINT  Chief Complaint   Patient presents with   • Painful Urination     x 4 days,  was seen yesterday at PCP and given IM abx,  states still not feeling well today.    • UTI   • Weakness     generalized     HPI  Abel Andersen is a 67 y.o. male who presents to the Emergency Department for dysuria onset 4 days ago. The patient reports constant dysuria after every urination and reports of intermittent pelvic pains. He was seen by his PCP yesterday, was diagnosed with UTI, and received IM antibiotics. However, the patient reports of subjective fever and generalized weakness today with intermittent nausea. He denies any hematuria. He states he has experienced similar symptoms of UTI many years ago. The patient has a pacemaker for atrial fibrillation and began taking daily medications, including Digoxin and Aspirin, on August 2017. He has an appointment with Cardiology in February 2018.     The patient's son helped translate the patient's Citizen of [place].     REVIEW OF SYSTEMS  Constitutional: Subjective fever, generalized weakness.   Skin: No rashes  HEENT: No sore throat or runny nose  Neck: No neck pain or stiffness  Chest: No pain or rashes  Pulm: No shortness of breath, cough, wheezing, stridor, or pain with inspiration/expiration  Gastrointestinal: No nausea, vomiting, diarrhea, constipation, or pain  Genitourinary: No urgency, frequency, hematuria, or polyuria. No scrotal or testicular pain  Musculoskeletal: No pain or swelling  Neurologic: No altered consciousness  Heme: No bleeding or bruising problems.   Immuno: No hx of recurrent infections  C.     PAST MEDICAL HISTORY   has a past medical history of Atrial fibrillation ([facility]) (8/28/2012); Back pain; CHEST PAIN (8/28/2012); Hyperlipemia (8/28/2012);  and Palpitations (8/28/2012).    SOCIAL HISTORY  Social History     Social History Main Topics   • Smoking status: Former Smoker     Packs/day: 0.50     Years: 10.00     Quit date: 8/28/1982   • Smokeless tobacco: Never Used   • Alcohol use No   • Drug use: No       SURGICAL HISTORY  patient denies any surgical history    CURRENT MEDICATIONS  Home Medications     Reviewed by Mirela Olson R.N. (Registered Nurse) on 11/22/17 at 2233  Med List Status: Complete   Medication Last Dose Status   aspirin EC (ECOTRIN) 325 MG Tablet Delayed Response 11/22/2017 Active   atorvastatin (LIPITOR) 20 MG Tab 11/22/2017 Active   digoxin (LANOXIN) 125 MCG Tab 11/22/2017 Active   nitroglycerin (NITROSTAT) 0.4 MG SUBL 9/6/2017 Active   sotalol (BETAPACE) 120 MG tablet 11/7/2017 Active                ALLERGIES  Allergies   Allergen Reactions   • Nkda [No Known Drug Allergy]        PHYSICAL EXAM  VITAL SIGNS: /72   Pulse 67   Temp 37.6 °C (99.7 °F)   Resp 16   Wt 69.7 kg (153 lb 10.6 oz)   SpO2 97%   BMI 25.57 kg/m²      Pulse ox interpretation: I interpret this pulse ox as normal.  Gen: Alert in no apparent distress.  HEENT: No signs of trauma, Bilateral external ears normal, Nose normal. Conjunctiva normal, Non-icteric.   Neck:  No tenderness, Supple, No masses  Lymphatic: No cervical lymphadenopathy noted.   Cardiovascular: Regular rate and rhythm, no murmurs.   Thorax & Lungs: Normal breath sounds, No respiratory distress, No wheezing bilateral chest rise  Abdomen: Bowel sounds normal, Soft, No tenderness, No masses, No pulsatile masses. No Guarding or rebound  Skin: Warm, Dry, No erythema, No rash.   Back: No bony tenderness, No CVA tenderness.   Extremities: Intact distal pulses, No edema, No tenderness, range of motion grossly normal all ext.   Neurologic: Alert , no facial droop, grossly normal coordination and strength  Psychiatric: Affect normal, Judgment normal, Mood normal.     DIAGNOSTIC STUDIES /  PROCEDURES    LABS  Results for orders placed or performed during the hospital encounter of 11/22/17   URINALYSIS,CULTURE IF INDICATED   Result Value Ref Range    Color Yellow     Character Clear     Specific Gravity 1.010 <1.035    Ph 5.5 5.0 - 8.0    Glucose Negative Negative mg/dL    Ketones Negative Negative mg/dL    Protein Negative Negative mg/dL    Bilirubin Negative Negative    Urobilinogen, Urine 1.0 Negative    Nitrite Negative Negative    Leukocyte Esterase Small (A) Negative    Occult Blood Negative Negative    Micro Urine Req Microscopic     Culture Indicated Yes UA Culture   URINE MICROSCOPIC (W/UA)   Result Value Ref Range    WBC 10-20 (A) /hpf    RBC 0-2 (A) /hpf    Bacteria Negative None /hpf    Epithelial Cells Few /hpf    Hyaline Cast 0-2 /lpf   CBC WITH DIFFERENTIAL   Result Value Ref Range    WBC 6.6 4.8 - 10.8 K/uL    RBC 4.29 (L) 4.70 - 6.10 M/uL    Hemoglobin 13.3 (L) 14.0 - 18.0 g/dL    Hematocrit 38.5 (L) 42.0 - 52.0 %    MCV 89.7 81.4 - 97.8 fL    MCH 31.0 27.0 - 33.0 pg    MCHC 34.5 33.7 - 35.3 g/dL    RDW 43.9 35.9 - 50.0 fL    Platelet Count 144 (L) 164 - 446 K/uL    MPV 10.3 9.0 - 12.9 fL    Neutrophils-Polys 75.90 (H) 44.00 - 72.00 %    Lymphocytes 15.00 (L) 22.00 - 41.00 %    Monocytes 8.40 0.00 - 13.40 %    Eosinophils 0.00 0.00 - 6.90 %    Basophils 0.20 0.00 - 1.80 %    Immature Granulocytes 0.50 0.00 - 0.90 %    Nucleated RBC 0.00 /100 WBC    Neutrophils (Absolute) 4.98 1.82 - 7.42 K/uL    Lymphs (Absolute) 0.98 (L) 1.00 - 4.80 K/uL    Monos (Absolute) 0.55 0.00 - 0.85 K/uL    Eos (Absolute) 0.00 0.00 - 0.51 K/uL    Baso (Absolute) 0.01 0.00 - 0.12 K/uL    Immature Granulocytes (abs) 0.03 0.00 - 0.11 K/uL    NRBC (Absolute) 0.00 K/uL   BASIC METABOLIC PANEL   Result Value Ref Range    Sodium 130 (L) 135 - 145 mmol/L    Potassium 3.6 3.6 - 5.5 mmol/L    Chloride 99 96 - 112 mmol/L    Co2 24 20 - 33 mmol/L    Glucose 120 (H) 65 - 99 mg/dL    Bun 12 8 - 22 mg/dL    Creatinine 0.83  0.50 - 1.40 mg/dL    Calcium 9.3 8.5 - 10.5 mg/dL    Anion Gap 7.0 0.0 - 11.9   DIGOXIN   Result Value Ref Range    Digoxin 0.4 (L) 0.8 - 2.0 ng/mL   ESTIMATED GFR   Result Value Ref Range    GFR If African American >60 >60 mL/min/1.73 m 2    GFR If Non African American >60 >60 mL/min/1.73 m 2     COURSE & MEDICAL DECISION MAKING  Pertinent Labs & Imaging studies reviewed. (See chart for details)    9:45 PM Ordered urinalysis culture, urine microscopic, and urine culture    10:56 PM - Patient seen and examined at bedside. Reviewed the patient's urinalsys results, which shows small LE and no bacteria. Discussed plan of care, including CBC, BMP, and digoxin. Patient agrees to the plan of care.     12:19 AM Reviewed the patient's lab work as shown above.     Reevaluation   Time:12:21 AM  Vital signs: /72   Pulse 67   Temp 37.6 °C (99.7 °F)   Resp 16   Wt 69.7 kg (153 lb 10.6 oz)   SpO2 97%   BMI 25.57 kg/m²   Assessment: He is resting comfortably in bed.     Patient arrives for evaluation of dysuria. Patient has had burning with urination for several days and does have some evidence to suggest an infectious process however it is not overwhelming. He does not have any abdominal pain or tenderness either on initial evaluation or reevaluation. He does not appear septic or toxic. He describes no gross hematuria. He has never had these symptoms before and does not have findings to suggest pyelonephritis. I felt it was reasonable to treat empirically for a urinary tract infection. The patient will be given Keflex for home while cultures are finalized. He stated clear understanding that he needed to follow-up with his primary care physician if his symptoms persist and to return if his symptoms worsen or change. I did consider appendicitis given the sterile pyuria, however this seems unlikely given his lack of pain or tenderness.  The patient will return for new or worsening symptoms and is stable at the time of  discharge.    The patient is referred to a primary physician for blood pressure management, diabetic screening, and for all other preventative health concerns.    DISPOSITION:  Patient will be discharged home in stable condition.    FOLLOW UP:  AMMON Saunders  [address]  [address]  [place] [address]  [phone]    In 1 week      [facility], Emergency Dept  [address] [address] [phone]    If symptoms worsen      OUTPATIENT MEDICATIONS:  New Prescriptions    CEPHALEXIN (KEFLEX) 500 MG CAP    Take 1 Cap by mouth 3 times a day for 7 days.     FINAL IMPRESSION  1. Dysuria      Jacque ULLOA (Xochiltibneo), am scribing for, and in the presence of, Akash Gu M.D..    Electronically signed by: Jacque Kwong (Geraldine), 11/22/2017    Akash ULLOA M.D. personally performed the services described in this documentation, as scribed by Jacque Kwong in my presence, and it is both accurate and complete.    Scribed for Akash Gu M.D. by Jacque Kwong. 11/22/2017  10:56 PM    The note accurately reflects work and decisions made by me.  Akash Gu  11/23/2017  12:56 AM     Less than monthly

## 2023-05-15 ENCOUNTER — HOSPITAL ENCOUNTER (OUTPATIENT)
Facility: MEDICAL CENTER | Age: 73
End: 2023-05-15
Attending: PHYSICIAN ASSISTANT
Payer: MEDICARE

## 2023-05-15 LAB
FORWARD REASON: SPWHY: NORMAL
FORWARDED TO LAB: SPWHR: NORMAL
SPECIMEN SENT: SPWT1: NORMAL